# Patient Record
Sex: FEMALE | Race: BLACK OR AFRICAN AMERICAN | Employment: FULL TIME | URBAN - METROPOLITAN AREA
[De-identification: names, ages, dates, MRNs, and addresses within clinical notes are randomized per-mention and may not be internally consistent; named-entity substitution may affect disease eponyms.]

---

## 2017-04-07 ENCOUNTER — TELEPHONE (OUTPATIENT)
Dept: INTERNAL MEDICINE CLINIC | Age: 42
End: 2017-04-07

## 2017-04-07 DIAGNOSIS — R73.03 PREDIABETES: ICD-10-CM

## 2017-04-07 DIAGNOSIS — Z00.00 ROUTINE GENERAL MEDICAL EXAMINATION AT A HEALTH CARE FACILITY: ICD-10-CM

## 2017-04-07 DIAGNOSIS — E55.9 VITAMIN D DEFICIENCY: ICD-10-CM

## 2017-04-07 DIAGNOSIS — E78.00 PURE HYPERCHOLESTEROLEMIA: Primary | ICD-10-CM

## 2017-04-28 DIAGNOSIS — E55.9 VITAMIN D DEFICIENCY: Primary | ICD-10-CM

## 2017-04-28 LAB
25(OH)D3+25(OH)D2 SERPL-MCNC: 17.8 NG/ML (ref 30–100)
ALBUMIN SERPL-MCNC: 4.5 G/DL (ref 3.5–5.5)
ALBUMIN/GLOB SERPL: 1.5 {RATIO} (ref 1.2–2.2)
ALP SERPL-CCNC: 35 IU/L (ref 39–117)
ALT SERPL-CCNC: 12 IU/L (ref 0–32)
AST SERPL-CCNC: 17 IU/L (ref 0–40)
BILIRUB SERPL-MCNC: 0.5 MG/DL (ref 0–1.2)
BUN SERPL-MCNC: 9 MG/DL (ref 6–24)
BUN/CREAT SERPL: 9 (ref 9–23)
CALCIUM SERPL-MCNC: 9.7 MG/DL (ref 8.7–10.2)
CHLORIDE SERPL-SCNC: 102 MMOL/L (ref 96–106)
CHOLEST SERPL-MCNC: 198 MG/DL (ref 100–199)
CO2 SERPL-SCNC: 22 MMOL/L (ref 18–29)
CREAT SERPL-MCNC: 1.02 MG/DL (ref 0.57–1)
ERYTHROCYTE [DISTWIDTH] IN BLOOD BY AUTOMATED COUNT: 13.3 % (ref 12.3–15.4)
EST. AVERAGE GLUCOSE BLD GHB EST-MCNC: 140 MG/DL
GLOBULIN SER CALC-MCNC: 3.1 G/DL (ref 1.5–4.5)
GLUCOSE SERPL-MCNC: 106 MG/DL (ref 65–99)
HBA1C MFR BLD: 6.5 % (ref 4.8–5.6)
HCT VFR BLD AUTO: 36 % (ref 34–46.6)
HDLC SERPL-MCNC: 48 MG/DL
HGB BLD-MCNC: 11.9 G/DL (ref 11.1–15.9)
LDLC SERPL CALC-MCNC: 141 MG/DL (ref 0–99)
MCH RBC QN AUTO: 29.2 PG (ref 26.6–33)
MCHC RBC AUTO-ENTMCNC: 33.1 G/DL (ref 31.5–35.7)
MCV RBC AUTO: 89 FL (ref 79–97)
PLATELET # BLD AUTO: 355 X10E3/UL (ref 150–379)
POTASSIUM SERPL-SCNC: 4.4 MMOL/L (ref 3.5–5.2)
PROT SERPL-MCNC: 7.6 G/DL (ref 6–8.5)
RBC # BLD AUTO: 4.07 X10E6/UL (ref 3.77–5.28)
SODIUM SERPL-SCNC: 140 MMOL/L (ref 134–144)
TRIGL SERPL-MCNC: 47 MG/DL (ref 0–149)
TSH SERPL DL<=0.005 MIU/L-ACNC: 1.05 UIU/ML (ref 0.45–4.5)
VLDLC SERPL CALC-MCNC: 9 MG/DL (ref 5–40)
WBC # BLD AUTO: 4.5 X10E3/UL (ref 3.4–10.8)

## 2017-04-28 RX ORDER — ERGOCALCIFEROL 1.25 MG/1
50000 CAPSULE ORAL
Qty: 8 CAP | Refills: 0 | Status: SHIPPED | OUTPATIENT
Start: 2017-04-28 | End: 2017-05-03

## 2017-04-28 RX ORDER — METFORMIN HYDROCHLORIDE 500 MG/1
500 TABLET ORAL 2 TIMES DAILY WITH MEALS
Qty: 60 TAB | Refills: 0 | Status: SHIPPED | OUTPATIENT
Start: 2017-04-28 | End: 2017-06-07 | Stop reason: SDUPTHER

## 2017-05-03 ENCOUNTER — OFFICE VISIT (OUTPATIENT)
Dept: INTERNAL MEDICINE CLINIC | Age: 42
End: 2017-05-03

## 2017-05-03 DIAGNOSIS — R73.03 PREDIABETES: Primary | ICD-10-CM

## 2017-05-03 DIAGNOSIS — E78.00 PURE HYPERCHOLESTEROLEMIA: ICD-10-CM

## 2017-05-03 DIAGNOSIS — E55.9 VITAMIN D DEFICIENCY: ICD-10-CM

## 2017-05-03 RX ORDER — CHOLECALCIFEROL (VITAMIN D3) 125 MCG
CAPSULE ORAL
COMMUNITY
End: 2017-06-19 | Stop reason: SDUPTHER

## 2017-05-03 NOTE — MR AVS SNAPSHOT
Visit Information Date & Time Provider Department Dept. Phone Encounter #  
 5/3/2017  8:30 AM Josee Evans, [address] [place] [address],[address] [phone] [id number] Follow-up Instructions Return in about 4 months (around 9/3/2017) for prediabetes vit d hld. Upcoming Health Maintenance Date Due INFLUENZA AGE 9 TO ADULT 8/1/2017 PAP AKA CERVICAL CYTOLOGY 7/6/2018 DTaP/Tdap/Td series (2 - Td) 9/11/2024 Allergies as of 5/3/2017  Review Complete On: 5/3/2017 By: Josee Evans MD  
  
 Severity Noted Reaction Type Reactions Pravastatin  08/30/2013    Myalgia Current Immunizations  Reviewed on 9/11/2014 Name Date Influenza Vaccine 10/7/2016 Influenza Vaccine PF 9/11/2014 TB Skin Test (PPD) Intradermal 8/11/2014 Tdap 9/11/2014 Not reviewed this visit You Were Diagnosed With   
  
 Codes Comments Prediabetes    -  Primary ICD-10-CM: R73.03 
ICD-9-CM: 790.29 Pure hypercholesterolemia     ICD-10-CM: E78.00 ICD-9-CM: 272.0 Vitamin D deficiency     ICD-10-CM: E55.9 ICD-9-CM: 268.9 Vitals OB Status Smoking Status Having regular periods Never Smoker Preferred Pharmacy Pharmacy Name Phone [facility] Matt Ojeda [address] [phone] Your Updated Medication List  
  
   
This list is accurate as of: 5/3/17  8:55 AM.  Always use your most recent med list.  
  
  
  
  
 ALPRAZolam 0.25 mg tablet Commonly known as:  XANAX  
TAKE 1 TABLET BY MOUTH ONCE DAILY AS NEEDED FOR ANXIETY  
  
 cetirizine 10 mg tablet Commonly known as:  ZYRTEC  
TAKE ONE TABLET BY MOUTH ONE TIME DAILY  
  
 cyclobenzaprine 5 mg tablet Commonly known as:  FLEXERIL Take 1 Tab by mouth three (3) times daily as needed for Muscle Spasm(s). FISH OIL 1,000 mg Cap Generic drug:  omega-3 fatty acids-vitamin e Take 1 Cap by mouth.  
  
 metFORMIN 500 mg tablet Commonly known as:  GLUCOPHAGE  
 Take 1 Tab by mouth two (2) times daily (with meals). multivitamin tablet Commonly known as:  ONE A DAY Take 1 Tab by mouth daily. VITAMIN D3 2,000 unit Tab Generic drug:  cholecalciferol (vitamin D3) Take  by mouth. We Performed the Following HEMOGLOBIN A1C WITH EAG [67473 CPT(R)] LIPID PANEL [14947 CPT(R)] METABOLIC PANEL, BASIC [48782 CPT(R)] VITAMIN D, 25 HYDROXY [id number] CPT(R)] Follow-up Instructions Return in about 4 months (around 9/3/2017) for prediabetes vit d hld. Introducing [facility] & HEALTH SERVICES! Dear Haley Cruz: Thank you for requesting a [employer] account. Our records indicate that you have previously registered for a [employer] account but its currently inactive. Please call our [employer] support line at [phone]. Additional Information If you have questions, please visit the Frequently Asked Questions section of the [employer] website at [email or web address] Remember, [employer] is NOT to be used for urgent needs. For medical emergencies, dial 911. Now available from your iPhone and Android! Please provide this summary of care documentation to your next provider. Your primary care clinician is listed as Kennard Lesches LEE. If you have any questions after today's visit, please call [phone].

## 2017-05-03 NOTE — PROGRESS NOTES
HISTORY OF PRESENT ILLNESS  Giovanny Monterroso is a 42 y.o. female. HPI     F/u prediabetes HLD, vit D deficiency  Had recent labs --a1c up to 6.5--I called in metformin 500mg bid last week but pt did not take  Has not started on livalo --tolerated but stopped  Vit d 17--not on vit d supplement  Has lost 5 lbs--portion control and walking more. 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 8 Cap 0    metFORMIN (GLUCOPHAGE) 500 mg tablet Take 1 Tab by mouth two (2) times daily (with meals). 60 Tab 0    cyclobenzaprine (FLEXERIL) 5 mg tablet Take 1 Tab by mouth three (3) times daily as needed for Muscle Spasm(s). 30 Tab 1    ALPRAZolam (XANAX) 0.25 mg tablet TAKE 1 TABLET BY MOUTH ONCE DAILY AS NEEDED FOR ANXIETY 10 Tab 0    pitavastatin (LIVALO) 1 mg tab tablet Take 1 Tab by mouth daily. 30 Tab 6    cholecalciferol (VITAMIN D3) 1,000 unit tablet Take  by mouth daily.  omega-3 fatty acids-vitamin e (FISH OIL) 1,000 mg cap Take 1 Cap by mouth.  multivitamin (ONE A DAY) tablet Take 1 Tab by mouth daily.        Allergies   Allergen Reactions    Pravastatin Myalgia      Lab Results  Component Value Date/Time   Hemoglobin A1c 6.5 04/27/2017 08:24 AM   Hemoglobin A1c 6.3 06/09/2016 09:32 AM   Hemoglobin A1c 6.2 06/24/2015 10:03 AM   Glucose 106 04/27/2017 08:24 AM   LDL, calculated 141 04/27/2017 08:24 AM   Creatinine 1.02 04/27/2017 08:24 AM      Lab Results  Component Value Date/Time   Cholesterol, total 198 04/27/2017 08:24 AM   HDL Cholesterol 48 04/27/2017 08:24 AM   LDL, calculated 141 04/27/2017 08:24 AM   Triglyceride 47 04/27/2017 08:24 AM       Lab Results  Component Value Date/Time   GFR est AA 78 04/27/2017 08:24 AM   GFR est non-AA 68 04/27/2017 08:24 AM   Creatinine 1.02 04/27/2017 08:24 AM   BUN 9 04/27/2017 08:24 AM   Sodium 140 04/27/2017 08:24 AM   Potassium 4.4 04/27/2017 08:24 AM   Chloride 102 04/27/2017 08:24 AM   CO2 22 04/27/2017 08:24 AM         ROS    Physical Exam   Constitutional: She appears well-developed and well-nourished. Appears stated age   Cardiovascular: Normal rate, regular rhythm and normal heart sounds. Exam reveals no gallop and no friction rub. No murmur heard. Pulmonary/Chest: Effort normal and breath sounds normal. No respiratory distress. She has no wheezes. Abdominal: Soft. Bowel sounds are normal.   Musculoskeletal: She exhibits no edema. Neurological: She is alert. Psychiatric: She has a normal mood and affect. Nursing note and vitals reviewed. ASSESSMENT and PLAN  Brad King was seen today for physical.    Diagnoses and all orders for this visit:    Prediabetes  -     HEMOGLOBIN A1C WITH EAG  -     METABOLIC PANEL, BASIC   Advised to start metformin 500mg bid   Carb counting handout and discussion    Pure hypercholesterolemia  -     LIPID PANEL  -     METABOLIC PANEL, BASIC   Not on statin , manage with diet and exercise for now    Vitamin D deficiency  -     VITAMIN D, 25 HYDROXY   Take vit d otc 2000 iu qd  rtc 4 months  Follow-up Disposition:  Return in about 4 months (around 9/3/2017) for prediabetes vit d hld.

## 2017-06-07 NOTE — TELEPHONE ENCOUNTER
Requested Prescriptions     Pending Prescriptions Disp Refills    metFORMIN (GLUCOPHAGE) 500 mg tablet 60 Tab 0     Sig: Take 1 Tab by mouth two (2) times daily (with meals).      Last OV-05/03/2017  Next OV-None  Med refilled-04/28/2017

## 2017-06-08 RX ORDER — METFORMIN HYDROCHLORIDE 500 MG/1
500 TABLET ORAL 2 TIMES DAILY WITH MEALS
Qty: 60 TAB | Refills: 11 | Status: SHIPPED | OUTPATIENT
Start: 2017-06-08 | End: 2018-01-30 | Stop reason: CLARIF

## 2017-06-19 RX ORDER — CHOLECALCIFEROL (VITAMIN D3) 125 MCG
1 CAPSULE ORAL
Qty: 8 TAB | Refills: 0 | Status: SHIPPED | OUTPATIENT
Start: 2017-06-25 | End: 2017-08-20 | Stop reason: SDUPTHER

## 2017-06-19 RX ORDER — CETIRIZINE HCL 10 MG
TABLET ORAL
Qty: 30 TAB | Refills: 5 | Status: SHIPPED | OUTPATIENT
Start: 2017-06-19 | End: 2017-08-09 | Stop reason: SDUPTHER

## 2017-06-19 NOTE — TELEPHONE ENCOUNTER
Requested Prescriptions     Pending Prescriptions Disp Refills    cetirizine (ZYRTEC) 10 mg tablet 30 Tab 5     Requested Prescriptions     Pending Prescriptions Disp Refills    cetirizine (ZYRTEC) 10 mg tablet 30 Tab 5    cholecalciferol, vitamin D3, (VITAMIN D3) 2,000 unit tab 8 Tab 0     Sig: Take 1 Tab by mouth every Sunday.        Last OV-5/3/17  Next OV-7/26/17  Med refilled-5/27/16

## 2017-08-09 NOTE — TELEPHONE ENCOUNTER
Requested Prescriptions     Pending Prescriptions Disp Refills    cetirizine (ZYRTEC) 10 mg tablet 90 Tab 3     Sig: TAKE ONE TABLET BY MOUTH ONE TIME DAILY   Last Refill:6/19/17    Last Office Visit: 5/3/17    Upcoming Appointment:9/1/17

## 2017-08-10 LAB
25(OH)D3+25(OH)D2 SERPL-MCNC: 28.1 NG/ML (ref 30–100)
BUN SERPL-MCNC: 11 MG/DL (ref 6–24)
BUN/CREAT SERPL: 12 (ref 9–23)
CALCIUM SERPL-MCNC: 9.9 MG/DL (ref 8.7–10.2)
CHLORIDE SERPL-SCNC: 100 MMOL/L (ref 96–106)
CHOLEST SERPL-MCNC: 226 MG/DL (ref 100–199)
CO2 SERPL-SCNC: 23 MMOL/L (ref 18–29)
CREAT SERPL-MCNC: 0.93 MG/DL (ref 0.57–1)
EST. AVERAGE GLUCOSE BLD GHB EST-MCNC: 123 MG/DL
GLUCOSE SERPL-MCNC: 104 MG/DL (ref 65–99)
HBA1C MFR BLD: 5.9 % (ref 4.8–5.6)
HDLC SERPL-MCNC: 53 MG/DL
LDLC SERPL CALC-MCNC: 161 MG/DL (ref 0–99)
POTASSIUM SERPL-SCNC: 4.4 MMOL/L (ref 3.5–5.2)
SODIUM SERPL-SCNC: 139 MMOL/L (ref 134–144)
TRIGL SERPL-MCNC: 59 MG/DL (ref 0–149)
VLDLC SERPL CALC-MCNC: 12 MG/DL (ref 5–40)

## 2017-08-10 RX ORDER — CETIRIZINE HCL 10 MG
TABLET ORAL
Qty: 90 TAB | Refills: 0 | Status: SHIPPED | OUTPATIENT
Start: 2017-08-10 | End: 2018-04-27 | Stop reason: SDUPTHER

## 2017-08-30 ENCOUNTER — HOSPITAL ENCOUNTER (OUTPATIENT)
Dept: MAMMOGRAPHY | Age: 42
Discharge: HOME | End: 2017-08-30
Attending: INTERNAL MEDICINE
Payer: COMMERCIAL

## 2017-08-30 DIAGNOSIS — Z12.31 VISIT FOR SCREENING MAMMOGRAM: ICD-10-CM

## 2017-08-30 PROCEDURE — 77063 BREAST TOMOSYNTHESIS BI: CPT

## 2017-09-01 ENCOUNTER — OFFICE VISIT (OUTPATIENT)
Dept: INTERNAL MEDICINE CLINIC | Age: 42
End: 2017-09-01

## 2017-09-01 VITALS
TEMPERATURE: 98.8 F | HEART RATE: 90 BPM | WEIGHT: 149 LBS | SYSTOLIC BLOOD PRESSURE: 110 MMHG | HEIGHT: 65 IN | RESPIRATION RATE: 16 BRPM | OXYGEN SATURATION: 97 % | BODY MASS INDEX: 24.83 KG/M2 | DIASTOLIC BLOOD PRESSURE: 76 MMHG

## 2017-09-01 DIAGNOSIS — E78.00 PURE HYPERCHOLESTEROLEMIA: ICD-10-CM

## 2017-09-01 DIAGNOSIS — R73.03 PREDIABETES: Primary | ICD-10-CM

## 2017-09-01 DIAGNOSIS — E55.9 VITAMIN D DEFICIENCY: ICD-10-CM

## 2017-09-01 RX ORDER — ROSUVASTATIN CALCIUM 5 MG/1
5 TABLET, COATED ORAL DAILY
Qty: 30 TAB | Refills: 6 | Status: SHIPPED | OUTPATIENT
Start: 2017-09-01 | End: 2018-01-31

## 2017-09-01 NOTE — MR AVS SNAPSHOT
Visit Information Date & Time Provider Department Dept. Phone Encounter #  
 9/1/2017  9:45 AM Ayan Isidro, [facility] [phone] [id number] Follow-up Instructions Return in about 4 months (around 1/1/2018) for prediaebtes hld vit d. Upcoming Health Maintenance Date Due INFLUENZA AGE 9 TO ADULT 8/1/2017 PAP AKA CERVICAL CYTOLOGY 7/6/2018 DTaP/Tdap/Td series (2 - Td) 9/11/2024 Allergies as of 9/1/2017  Review Complete On: 9/1/2017 By: Ha Feldman Severity Noted Reaction Type Reactions Pravastatin  08/30/2013    Myalgia Current Immunizations  Reviewed on 9/11/2014 Name Date Influenza Vaccine 10/7/2016 Influenza Vaccine PF 9/11/2014 TB Skin Test (PPD) Intradermal 8/11/2014 Tdap 9/11/2014 Not reviewed this visit You Were Diagnosed With   
  
 Codes Comments Prediabetes    -  Primary ICD-10-CM: R73.03 
ICD-9-CM: 790.29 Vitamin D deficiency     ICD-10-CM: E55.9 ICD-9-CM: 268.9 Pure hypercholesterolemia     ICD-10-CM: E78.00 ICD-9-CM: 272.0 Vitals BP Pulse Temp Resp Height(growth percentile) Weight(growth percentile) 110/76 (BP 1 Location: Left arm, BP Patient Position: Sitting) 90 98.8 °F (37.1 °C) (Oral) 16 5' 5\" (1.651 m) 149 lb (67.6 kg) SpO2 BMI OB Status Smoking Status 97% 24.79 kg/m2 IUD Never Smoker Vitals History BMI and BSA Data Body Mass Index Body Surface Area 24.79 kg/m 2 1.76 m 2 Preferred Pharmacy Pharmacy Name Phone [facility], [address] [phone] Your Updated Medication List  
  
   
This list is accurate as of: 9/1/17 10:07 AM.  Always use your most recent med list.  
  
  
  
  
 ALPRAZolam 0.25 mg tablet Commonly known as:  XANAX  
TAKE 1 TABLET BY MOUTH ONCE DAILY AS NEEDED FOR ANXIETY  
  
 cetirizine 10 mg tablet Commonly known as:  ZYRTEC  
 TAKE ONE TABLET BY MOUTH ONE TIME DAILY  
  
 cyclobenzaprine 5 mg tablet Commonly known as:  FLEXERIL Take 1 Tab by mouth three (3) times daily as needed for Muscle Spasm(s). FISH OIL 1,000 mg Cap Generic drug:  omega-3 fatty acids-vitamin e Take 1 Cap by mouth.  
  
 metFORMIN 500 mg tablet Commonly known as:  GLUCOPHAGE Take 1 Tab by mouth two (2) times daily (with meals). multivitamin tablet Commonly known as:  ONE A DAY Take 1 Tab by mouth daily. rosuvastatin 5 mg tablet Commonly known as:  CRESTOR Take 1 Tab by mouth daily. Prescriptions Sent to Pharmacy Refills  
 rosuvastatin (CRESTOR) 5 mg tablet 6 Sig: Take 1 Tab by mouth daily. Class: Normal  
 Pharmacy: [address], [address] [place] [address] #: [phone] Route: Oral  
  
We Performed the Following ALT [id number] CPT(R)] AST [id number] CPT(R)] LIPID PANEL [06930 CPT(R)] Follow-up Instructions Return in about 4 months (around 1/1/2018) for prediaebtes hld vit d. Introducing [facility] & HEALTH SERVICES! Dear Alessandra Heads: Thank you for requesting a [employer] account. Our records indicate that you have previously registered for a [employer] account but its currently inactive. Please call our [employer] support line at [phone]. Additional Information If you have questions, please visit the Frequently Asked Questions section of the [employer] website at [email or web address] Remember, [employer] is NOT to be used for urgent needs. For medical emergencies, dial 911. Now available from your iPhone and Android! Please provide this summary of care documentation to your next provider. Your primary care clinician is listed as Polish Mini GÉNESIS. If you have any questions after today's visit, please call [phone].

## 2017-09-01 NOTE — PROGRESS NOTES
HISTORY OF PRESENT ILLNESS  Johnny Greenwood is a 36 y.o. female. HPI      F/u prediabetes HLD, vit D deficiency  Had recent labs --a1c down to 5.9 on metformn-tolerating med  Not on statin  On heart healthy diet but lipids further elevated today  Vit d 28--not on vit d supplement, took a few doses of ergocalciferol  Has lost 3 lbs--portion control and walking more. 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 8 Cap 6    cetirizine (ZYRTEC) 10 mg tablet TAKE ONE TABLET BY MOUTH ONE TIME DAILY 90 Tab 0    metFORMIN (GLUCOPHAGE) 500 mg tablet Take 1 Tab by mouth two (2) times daily (with meals). 60 Tab 11    cyclobenzaprine (FLEXERIL) 5 mg tablet Take 1 Tab by mouth three (3) times daily as needed for Muscle Spasm(s). 30 Tab 1    ALPRAZolam (XANAX) 0.25 mg tablet TAKE 1 TABLET BY MOUTH ONCE DAILY AS NEEDED FOR ANXIETY 10 Tab 0    omega-3 fatty acids-vitamin e (FISH OIL) 1,000 mg cap Take 1 Cap by mouth.  multivitamin (ONE A DAY) tablet Take 1 Tab by mouth daily.        Allergies   Allergen Reactions    Pravastatin Myalgia      Lab Results  Component Value Date/Time   WBC 4.5 04/27/2017 08:24 AM   HGB 11.9 04/27/2017 08:24 AM   HCT 36.0 04/27/2017 08:24 AM   PLATELET 358 36/03/2572 08:24 AM   MCV 89 04/27/2017 08:24 AM     Lab Results  Component Value Date/Time   Hemoglobin A1c 5.9 08/09/2017 09:03 AM   Hemoglobin A1c 6.5 04/27/2017 08:24 AM   Hemoglobin A1c 6.3 06/09/2016 09:32 AM   Glucose 104 08/09/2017 09:03 AM   LDL, calculated 161 08/09/2017 09:03 AM   Creatinine 0.93 08/09/2017 09:03 AM      Lab Results  Component Value Date/Time   Cholesterol, total 226 08/09/2017 09:03 AM HDL Cholesterol 53 08/09/2017 09:03 AM   LDL, calculated 161 08/09/2017 09:03 AM   Triglyceride 59 08/09/2017 09:03 AM   Lab Results  Component Value Date/Time   ALT (SGPT) 12 04/27/2017 08:24 AM   AST (SGOT) 17 04/27/2017 08:24 AM   Alk. phosphatase 35 04/27/2017 08:24 AM   Bilirubin, total 0.5 04/27/2017 08:24 AM   Albumin 4.5 04/27/2017 08:24 AM   Protein, total 7.6 04/27/2017 08:24 AM   PLATELET 355 25/71/3347 08:24 AM       Lab Results  Component Value Date/Time   GFR est non-AA 76 08/09/2017 09:03 AM   GFR est AA 87 08/09/2017 09:03 AM   Creatinine 0.93 08/09/2017 09:03 AM   BUN 11 08/09/2017 09:03 AM   Sodium 139 08/09/2017 09:03 AM   Potassium 4.4 08/09/2017 09:03 AM   Chloride 100 08/09/2017 09:03 AM   CO2 23 08/09/2017 09:03 AM     Lab Results   Component Value Date/Time    Glucose 104 08/09/2017 09:03 AM                     ROS    Physical Exam   Constitutional: She appears well-developed and well-nourished. Appears stated age   Cardiovascular: Normal rate, regular rhythm and normal heart sounds. Exam reveals no gallop and no friction rub. No murmur heard. Pulmonary/Chest: Effort normal and breath sounds normal. No respiratory distress. She has no wheezes. Abdominal: Soft. Bowel sounds are normal.   Musculoskeletal: She exhibits no edema. Neurological: She is alert. Psychiatric: She has a normal mood and affect. Nursing note and vitals reviewed. ASSESSMENT and PLAN  Diagnoses and all orders for this visit:    1. Prediabetes   Improved a1c on metformin and diet--continue  2. Vitamin D deficiency   Start vit d 1000 iu qd  3. Pure hypercholesterolemia  -     LIPID PANEL  -     AST  -     ALT   Start crestor 5 mg every other day--can stop if myalgias occur   Labs in 6 weeks    Other orders  -     rosuvastatin (CRESTOR) 5 mg tablet; Take 1 Tab by mouth daily. Follow-up Disposition:  Return in about 4 months (around 1/1/2018) for prediaebtes hld vit d.

## 2018-01-09 ENCOUNTER — TELEPHONE (OUTPATIENT)
Dept: INTERNAL MEDICINE CLINIC | Age: 43
End: 2018-01-09

## 2018-01-09 ENCOUNTER — HOSPITAL ENCOUNTER (OUTPATIENT)
Dept: GENERAL RADIOLOGY | Age: 43
Discharge: HOME | End: 2018-01-09
Payer: COMMERCIAL

## 2018-01-09 DIAGNOSIS — Z11.1 TUBERCULOSIS SCREENING: ICD-10-CM

## 2018-01-09 DIAGNOSIS — Z92.89 HISTORY OF POSITIVE PPD: ICD-10-CM

## 2018-01-09 DIAGNOSIS — Z92.89 HISTORY OF POSITIVE PPD: Primary | ICD-10-CM

## 2018-01-09 PROCEDURE — 71046 X-RAY EXAM CHEST 2 VIEWS: CPT

## 2018-01-09 NOTE — TELEPHONE ENCOUNTER
PSR received phone call from pt. Pt is requesting for a order for a chest XRay. Pt also states she is requesting for a yearly TB screening.

## 2018-01-18 DIAGNOSIS — I10 ESSENTIAL HYPERTENSION, MALIGNANT: Primary | ICD-10-CM

## 2018-01-24 ENCOUNTER — APPOINTMENT (OUTPATIENT)
Dept: INTERNAL MEDICINE CLINIC | Age: 43
End: 2018-01-24

## 2018-01-25 LAB
ALBUMIN SERPL-MCNC: 4.5 G/DL (ref 3.5–5.5)
ALBUMIN/GLOB SERPL: 1.4 {RATIO} (ref 1.2–2.2)
ALP SERPL-CCNC: 33 IU/L (ref 39–117)
ALT SERPL-CCNC: 9 IU/L (ref 0–32)
AST SERPL-CCNC: 13 IU/L (ref 0–40)
BASOPHILS # BLD AUTO: 0 X10E3/UL (ref 0–0.2)
BASOPHILS NFR BLD AUTO: 1 %
BILIRUB SERPL-MCNC: 0.4 MG/DL (ref 0–1.2)
BUN SERPL-MCNC: 9 MG/DL (ref 6–24)
BUN/CREAT SERPL: 9 (ref 9–23)
CALCIUM SERPL-MCNC: 9.6 MG/DL (ref 8.7–10.2)
CHLORIDE SERPL-SCNC: 105 MMOL/L (ref 96–106)
CHOLEST SERPL-MCNC: 189 MG/DL (ref 100–199)
CO2 SERPL-SCNC: 20 MMOL/L (ref 18–29)
CREAT SERPL-MCNC: 1.02 MG/DL (ref 0.57–1)
EOSINOPHIL # BLD AUTO: 0.2 X10E3/UL (ref 0–0.4)
EOSINOPHIL NFR BLD AUTO: 5 %
ERYTHROCYTE [DISTWIDTH] IN BLOOD BY AUTOMATED COUNT: 13 % (ref 12.3–15.4)
EST. AVERAGE GLUCOSE BLD GHB EST-MCNC: 123 MG/DL
GFR SERPLBLD CREATININE-BSD FMLA CKD-EPI: 68 ML/MIN/1.73
GFR SERPLBLD CREATININE-BSD FMLA CKD-EPI: 78 ML/MIN/1.73
GLOBULIN SER CALC-MCNC: 3.2 G/DL (ref 1.5–4.5)
GLUCOSE SERPL-MCNC: 97 MG/DL (ref 65–99)
HBA1C MFR BLD: 5.9 % (ref 4.8–5.6)
HCT VFR BLD AUTO: 37.3 % (ref 34–46.6)
HDLC SERPL-MCNC: 47 MG/DL
HGB BLD-MCNC: 12.3 G/DL (ref 11.1–15.9)
IMM GRANULOCYTES # BLD: 0 X10E3/UL (ref 0–0.1)
IMM GRANULOCYTES NFR BLD: 0 %
LDLC SERPL CALC-MCNC: 131 MG/DL (ref 0–99)
LYMPHOCYTES # BLD AUTO: 1.9 X10E3/UL (ref 0.7–3.1)
LYMPHOCYTES NFR BLD AUTO: 48 %
MCH RBC QN AUTO: 29.1 PG (ref 26.6–33)
MCHC RBC AUTO-ENTMCNC: 33 G/DL (ref 31.5–35.7)
MCV RBC AUTO: 88 FL (ref 79–97)
MONOCYTES # BLD AUTO: 0.2 X10E3/UL (ref 0.1–0.9)
MONOCYTES NFR BLD AUTO: 6 %
NEUTROPHILS # BLD AUTO: 1.5 X10E3/UL (ref 1.4–7)
NEUTROPHILS NFR BLD AUTO: 40 %
PLATELET # BLD AUTO: 304 X10E3/UL (ref 150–379)
POTASSIUM SERPL-SCNC: 4.3 MMOL/L (ref 3.5–5.2)
PROT SERPL-MCNC: 7.7 G/DL (ref 6–8.5)
RBC # BLD AUTO: 4.22 X10E6/UL (ref 3.77–5.28)
SODIUM SERPL-SCNC: 143 MMOL/L (ref 134–144)
TRIGL SERPL-MCNC: 55 MG/DL (ref 0–149)
VLDLC SERPL CALC-MCNC: 11 MG/DL (ref 5–40)
WBC # BLD AUTO: 3.9 X10E3/UL (ref 3.4–10.8)

## 2018-01-30 ENCOUNTER — OFFICE VISIT (OUTPATIENT)
Dept: INTERNAL MEDICINE CLINIC | Age: 43
End: 2018-01-30

## 2018-01-30 VITALS
DIASTOLIC BLOOD PRESSURE: 69 MMHG | HEART RATE: 93 BPM | TEMPERATURE: 98.3 F | BODY MASS INDEX: 25.16 KG/M2 | SYSTOLIC BLOOD PRESSURE: 106 MMHG | OXYGEN SATURATION: 100 % | HEIGHT: 65 IN | WEIGHT: 151 LBS

## 2018-01-30 DIAGNOSIS — E78.00 PURE HYPERCHOLESTEROLEMIA: ICD-10-CM

## 2018-01-30 DIAGNOSIS — R73.03 PREDIABETES: Primary | ICD-10-CM

## 2018-01-30 RX ORDER — METFORMIN HYDROCHLORIDE 500 MG/1
500 TABLET, EXTENDED RELEASE ORAL
Qty: 180 TAB | Refills: 3 | Status: SHIPPED | OUTPATIENT
Start: 2018-01-30 | End: 2018-01-30 | Stop reason: SDUPTHER

## 2018-01-30 RX ORDER — METFORMIN HYDROCHLORIDE 500 MG/1
1000 TABLET, EXTENDED RELEASE ORAL
Qty: 180 TAB | Refills: 3 | Status: SHIPPED | OUTPATIENT
Start: 2018-01-30 | End: 2018-12-31 | Stop reason: SDUPTHER

## 2018-01-30 NOTE — PROGRESS NOTES
HISTORY OF PRESENT ILLNESS  Karli Connolly is a 36 y.o. female. HPI     F/u prediabetes hld  Stopped crestor due to myalgias  Takes metformin 500mg bid  Lost a few lbs in weight-exercises several days per week  Feels well overall    Patient Active Problem List    Diagnosis Date Noted    Prediabetes 05/03/2017    Vitamin D deficiency 01/05/2012    Hyperlipidemia 08/17/2011     Current Outpatient Prescriptions   Medication Sig Dispense Refill    metFORMIN ER (GLUCOPHAGE XR) 500 mg tablet Take 2 Tabs by mouth daily (with dinner). 180 Tab 3    omega-3 fatty acids-vitamin e (FISH OIL) 1,000 mg cap Take 1 Cap by mouth.  multivitamin (ONE A DAY) tablet Take 1 Tab by mouth daily.  rosuvastatin (CRESTOR) 5 mg tablet Take 1 Tab by mouth daily. 30 Tab 6    cetirizine (ZYRTEC) 10 mg tablet TAKE ONE TABLET BY MOUTH ONE TIME DAILY 90 Tab 0    cyclobenzaprine (FLEXERIL) 5 mg tablet Take 1 Tab by mouth three (3) times daily as needed for Muscle Spasm(s). 30 Tab 1    ALPRAZolam (XANAX) 0.25 mg tablet TAKE 1 TABLET BY MOUTH ONCE DAILY AS NEEDED FOR ANXIETY 10 Tab 0     Allergies   Allergen Reactions    Crestor [Rosuvastatin] Myalgia    Pravastatin Myalgia      Lab Results  Component Value Date/Time   Hemoglobin A1c 5.9 01/24/2018 08:58 AM   Hemoglobin A1c 5.9 08/09/2017 09:03 AM   Hemoglobin A1c 6.5 04/27/2017 08:24 AM   Glucose 97 01/24/2018 08:58 AM   LDL, calculated 131 01/24/2018 08:58 AM   Creatinine 1.02 01/24/2018 08:58 AM      Lab Results  Component Value Date/Time   ALT (SGPT) 9 01/24/2018 08:58 AM   AST (SGOT) 13 01/24/2018 08:58 AM   Alk.  phosphatase 33 01/24/2018 08:58 AM   Bilirubin, total 0.4 01/24/2018 08:58 AM   Albumin 4.5 01/24/2018 08:58 AM   Protein, total 7.7 01/24/2018 08:58 AM   PLATELET 203 90/38/1248 08:58 AM       Lab Results  Component Value Date/Time   GFR est non-AA 68 01/24/2018 08:58 AM   GFR est AA 78 01/24/2018 08:58 AM   Creatinine 1.02 01/24/2018 08:58 AM   BUN 9 01/24/2018 08:58 AM   Sodium 143 01/24/2018 08:58 AM   Potassium 4.3 01/24/2018 08:58 AM   Chloride 105 01/24/2018 08:58 AM   CO2 20 01/24/2018 08:58 AM        ROS    Physical Exam   Constitutional: She appears well-developed and well-nourished. Appears stated age   Cardiovascular: Normal rate, regular rhythm and normal heart sounds. Exam reveals no gallop and no friction rub. No murmur heard. Pulmonary/Chest: Effort normal and breath sounds normal. No respiratory distress. She has no wheezes. She has no rales. She exhibits no tenderness. Abdominal: Soft. Bowel sounds are normal.   Musculoskeletal: She exhibits no edema. Neurological: She is alert. Psychiatric: She has a normal mood and affect. Nursing note and vitals reviewed. ASSESSMENT and PLAN  Diagnoses and all orders for this visit:    1. Prediabetes   Stable a1c, normal fasting glucose   Change metformin to SR 1000mg every day   Continue diet and exercise  2. Pure hypercholesterolemia    Improved with diet and exercise  Other orders  -     metFORMIN ER (GLUCOPHAGE XR) 500 mg tablet; Take 2 Tabs by mouth daily (with dinner). Follow-up Disposition:  Return in about 6 months (around 7/30/2018) for prediabetes hld.

## 2018-01-30 NOTE — MR AVS SNAPSHOT
[address] [facility] 
[phone] Patient: Isamar Leblanc MRN: [id number] [facility]:[birth year] Visit Information Date & Time Provider Department Dept. Phone Encounter #  
 1/30/2018  3:45 PM Marcos Muller, [address] [place] [address],[address] [phone] [id number] Follow-up Instructions Return in about 6 months (around 7/30/2018) for prediabetes hld. Upcoming Health Maintenance Date Due Influenza Age 5 to Adult 8/1/2017 PAP AKA CERVICAL CYTOLOGY 7/6/2018 DTaP/Tdap/Td series (2 - Td) 9/11/2024 Allergies as of 1/30/2018  Review Complete On: 1/30/2018 By: Marcos Muller MD  
  
 Severity Noted Reaction Type Reactions Crestor [Rosuvastatin]  01/30/2018    Myalgia Pravastatin  08/30/2013    Myalgia Current Immunizations  Reviewed on 9/11/2014 Name Date Influenza Vaccine 10/7/2016 Influenza Vaccine PF 9/11/2014 TB Skin Test (PPD) Intradermal 8/11/2014 Tdap 9/11/2014 Not reviewed this visit Vitals BP Pulse Temp Height(growth percentile) Weight(growth percentile) SpO2  
 106/69 (BP 1 Location: Left arm, BP Patient Position: Sitting) 93 98.3 °F (36.8 °C) (Oral) 5' 5\" (1.651 m) 151 lb (68.5 kg) 100% BMI OB Status Smoking Status 25.13 kg/m2 IUD Never Smoker BMI and BSA Data Body Mass Index Body Surface Area  
 25.13 kg/m 2 1.77 m 2 Preferred Pharmacy Pharmacy Name Phone [facility], [address] [address] AT [address] [place] [address] [phone] Your Updated Medication List  
  
   
This list is accurate as of: 1/30/18  3:46 PM.  Always use your most recent med list.  
  
  
  
  
 ALPRAZolam 0.25 mg tablet Commonly known as:  XANAX  
TAKE 1 TABLET BY MOUTH ONCE DAILY AS NEEDED FOR ANXIETY  
  
 cetirizine 10 mg tablet Commonly known as:  ZYRTEC  
TAKE ONE TABLET BY MOUTH ONE TIME DAILY cyclobenzaprine 5 mg tablet Commonly known as:  FLEXERIL Take 1 Tab by mouth three (3) times daily as needed for Muscle Spasm(s). FISH OIL 1,000 mg Cap Generic drug:  omega-3 fatty acids-vitamin e Take 1 Cap by mouth.  
  
 metFORMIN  mg tablet Commonly known as:  GLUCOPHAGE XR Take 2 Tabs by mouth daily (with dinner). multivitamin tablet Commonly known as:  ONE A DAY Take 1 Tab by mouth daily. rosuvastatin 5 mg tablet Commonly known as:  CRESTOR Take 1 Tab by mouth daily. Prescriptions Sent to Pharmacy Refills  
 metFORMIN ER (GLUCOPHAGE XR) 500 mg tablet 3 Sig: Take 2 Tabs by mouth daily (with dinner). Class: Normal  
 Pharmacy: [facility] [address] [place] [address], [address] [place] [address] #: [phone] Route: Oral  
  
Follow-up Instructions Return in about 6 months (around 7/30/2018) for prediabetes hld. Introducing [facility] & HEALTH SERVICES! Dear Jessica Paul: Thank you for requesting a [employer] account. Our records indicate that you have previously registered for a [employer] account but its currently inactive. Please call our [employer] support line at [phone]. Additional Information If you have questions, please visit the Frequently Asked Questions section of the [employer] website at [email or web address] Remember, [employer] is NOT to be used for urgent needs. For medical emergencies, dial 911. Now available from your iPhone and Android! Please provide this summary of care documentation to your next provider. Your primary care clinician is listed as Kristin CAPPS. If you have any questions after today's visit, please call [phone].

## 2018-01-31 ENCOUNTER — OFFICE VISIT (OUTPATIENT)
Dept: PRIMARY CARE CLINIC | Age: 43
End: 2018-01-31

## 2018-01-31 VITALS
BODY MASS INDEX: 25.46 KG/M2 | HEART RATE: 115 BPM | HEIGHT: 65 IN | DIASTOLIC BLOOD PRESSURE: 66 MMHG | WEIGHT: 152.8 LBS | OXYGEN SATURATION: 98 % | TEMPERATURE: 100.4 F | SYSTOLIC BLOOD PRESSURE: 123 MMHG | RESPIRATION RATE: 16 BRPM

## 2018-01-31 DIAGNOSIS — J11.1 INFLUENZA: Primary | ICD-10-CM

## 2018-01-31 DIAGNOSIS — R52 BODY ACHES: ICD-10-CM

## 2018-01-31 LAB
QUICKVUE INFLUENZA TEST: POSITIVE
VALID INTERNAL CONTROL?: YES

## 2018-01-31 RX ORDER — METFORMIN HYDROCHLORIDE 500 MG/1
TABLET ORAL
Refills: 7 | COMMUNITY
Start: 2017-11-30 | End: 2018-01-31

## 2018-01-31 RX ORDER — OSELTAMIVIR PHOSPHATE 75 MG/1
75 CAPSULE ORAL 2 TIMES DAILY
Qty: 10 CAP | Refills: 0 | Status: SHIPPED | OUTPATIENT
Start: 2018-01-31 | End: 2018-02-05

## 2018-01-31 RX ORDER — NICOTINE 11MG/24HR
PATCH, TRANSDERMAL 24 HOURS TRANSDERMAL
Refills: 6 | COMMUNITY
Start: 2017-12-19 | End: 2020-10-26 | Stop reason: SDUPTHER

## 2018-01-31 RX ORDER — IBUPROFEN 200 MG
200 TABLET ORAL
Qty: 30 TAB | Refills: 0 | Status: SHIPPED | OUTPATIENT
Start: 2018-01-31

## 2018-01-31 RX ORDER — MEDROXYPROGESTERONE ACETATE 150 MG/ML
INJECTION, SUSPENSION INTRAMUSCULAR
Refills: 4 | COMMUNITY
Start: 2017-12-20

## 2018-01-31 NOTE — MR AVS SNAPSHOT
[address] [facility] 
 
 
 [address] 
[phone] Patient: Bonifacio Hill MRN: [id number] ORD:3/0/4250 Visit Information Date & Time Provider Department Dept. Phone Encounter #  
 1/31/2018  3:45 PM Guy George Jennifer [id number] Your Appointments 7/31/2018  8:15 AM  
ROUTINE CARE with Daisha Escalera, [address] [place] [address] Appt Note: 6 month follow up  
 [address] [address]  
[address] [facility] [address] Upcoming Health Maintenance Date Due  
 PAP AKA CERVICAL CYTOLOGY 7/6/2018 DTaP/Tdap/Td series (2 - Td) 9/11/2024 Allergies as of 1/31/2018  Review Complete On: 1/31/2018 By: Gala Ruiz MD  
  
 Severity Noted Reaction Type Reactions Crestor [Rosuvastatin]  01/30/2018    Myalgia Pravastatin  08/30/2013    Myalgia Current Immunizations  Reviewed on 9/11/2014 Name Date Influenza Vaccine 9/13/2017, 10/7/2016 Influenza Vaccine PF 9/11/2014 TB Skin Test (PPD) Intradermal 8/11/2014 Tdap 9/11/2014 Not reviewed this visit You Were Diagnosed With   
  
 Codes Comments Influenza    -  Primary ICD-10-CM: J11.1 ICD-9-CM: 028.8 Body aches     ICD-10-CM: R52 ICD-9-CM: 780.96 Vitals BP Pulse Temp Resp Height(growth percentile) Weight(growth percentile) 123/66 (!) 115 100.4 °F (38 °C) (Oral) 16 5' 5\" (1.651 m) 152 lb 12.8 oz (69.3 kg) SpO2 BMI OB Status Smoking Status 98% 25.43 kg/m2 IUD Never Smoker Vitals History BMI and BSA Data Body Mass Index Body Surface Area  
 25.43 kg/m 2 1.78 m 2 Preferred Pharmacy Pharmacy Name Phone [facility] [address] [facility], [address] [place] [address] AT [address] [place] [address] [phone] Your Updated Medication List  
  
   
This list is accurate as of: 1/31/18  4:03 PM.  Always use your most recent med list.  
  
  
  
  
 ALPRAZolam 0.25 mg tablet Commonly known as:  XANAX  
TAKE 1 TABLET BY MOUTH ONCE DAILY AS NEEDED FOR ANXIETY  
  
 cetirizine 10 mg tablet Commonly known as:  ZYRTEC  
TAKE ONE TABLET BY MOUTH ONE TIME DAILY  
  
 cyclobenzaprine 5 mg tablet Commonly known as:  FLEXERIL Take 1 Tab by mouth three (3) times daily as needed for Muscle Spasm(s). FISH OIL 1,000 mg Cap Generic drug:  omega-3 fatty acids-vitamin e Take 1 Cap by mouth.  
  
 medroxyPROGESTERone 150 mg/mL Syrg Commonly known as:  DEPO-PROVERA  
1 PREFILLED SYRINGE, BRING UNIT TO OFFICE  
  
 metFORMIN  mg tablet Commonly known as:  GLUCOPHAGE XR Take 2 Tabs by mouth daily (with dinner). multivitamin tablet Commonly known as:  ONE A DAY Take 1 Tab by mouth daily. oseltamivir 75 mg capsule Commonly known as:  TAMIFLU Take 1 Cap by mouth two (2) times a day for 5 days. VITAMIN D3 2,000 unit Cap capsule Generic drug:  Cholecalciferol (Vitamin D3) TAKE 1 TABLET BY MOUTH EVERY SUNDAY. Prescriptions Sent to Pharmacy Refills  
 oseltamivir (TAMIFLU) 75 mg capsule 0 Sig: Take 1 Cap by mouth two (2) times a day for 5 days. Class: Normal  
 Pharmacy: [facility] [address] [place] [address], [address] [place], [place] [address]: [phone] Route: Oral  
  
We Performed the Following AMB POC RAPID INFLUENZA TEST [36218 CPT(R)] Patient Instructions Influenza (Flu): Care Instructions Your Care Instructions Influenza (flu) is an infection in the lungs and breathing passages. It is caused by the influenza virus. There are different strains, or types, of the flu virus from year to year.  Unlike the common cold, the flu comes on suddenly and the symptoms, such as a cough, congestion, fever, chills, fatigue, aches, and pains, are more severe. These symptoms may last up to 10 days. Although the flu can make you feel very sick, it usually doesn't cause serious health problems. Home treatment is usually all you need for flu symptoms. But your doctor may prescribe antiviral medicine to prevent other health problems, such as pneumonia, from developing. Older people and those who have a long-term health condition, such as lung disease, are most at risk for having pneumonia or other health problems. Follow-up care is a key part of your treatment and safety. Be sure to make and go to all appointments, and call your doctor if you are having problems. It's also a good idea to know your test results and keep a list of the medicines you take. How can you care for yourself at home? · Get plenty of rest. 
· Drink plenty of fluids, enough so that your urine is light yellow or clear like water. If you have kidney, heart, or liver disease and have to limit fluids, talk with your doctor before you increase the amount of fluids you drink. · Take an over-the-counter pain medicine if needed, such as acetaminophen (Tylenol), ibuprofen (Advil, Motrin), or naproxen (Aleve), to relieve fever, headache, and muscle aches. Read and follow all instructions on the label. No one younger than 20 should take aspirin. It has been linked to Reye syndrome, a serious illness. · Do not smoke. Smoking can make the flu worse. If you need help quitting, talk to your doctor about stop-smoking programs and medicines. These can increase your chances of quitting for good. · Breathe moist air from a hot shower or from a sink filled with hot water to help clear a stuffy nose. · Before you use cough and cold medicines, check the label. These medicines may not be safe for young children or for people with certain health problems. · If the skin around your nose and lips becomes sore, put some petroleum jelly on the area. · To ease coughing: ¨ Drink fluids to soothe a scratchy throat. ¨ Suck on cough drops or plain hard candy. ¨ Take an over-the-counter cough medicine that contains dextromethorphan to help you get some sleep. Read and follow all instructions on the label. ¨ Raise your head at night with an extra pillow. This may help you rest if coughing keeps you awake. · Take any prescribed medicine exactly as directed. Call your doctor if you think you are having a problem with your medicine. To avoid spreading the flu · Wash your hands regularly, and keep your hands away from your face. · Stay home from school, work, and other public places until you are feeling better and your fever has been gone for at least 24 hours. The fever needs to have gone away on its own without the help of medicine. · Ask people living with you to talk to their doctors about preventing the flu. They may get antiviral medicine to keep from getting the flu from you. · To prevent the flu in the future, get a flu vaccine every fall. Encourage people living with you to get the vaccine. · Cover your mouth when you cough or sneeze. When should you call for help? Call 911 anytime you think you may need emergency care. For example, call if: 
? · You have severe trouble breathing. ?Call your doctor now or seek immediate medical care if: 
? · You have new or worse trouble breathing. ? · You seem to be getting much sicker. ? · You feel very sleepy or confused. ? · You have a new or higher fever. ? · You get a new rash. ? Watch closely for changes in your health, and be sure to contact your doctor if: 
? · You begin to get better and then get worse. ? · You are not getting better after 1 week. Where can you learn more? Go to http://megan-carlo.info/. Enter W846 in the search box to learn more about \"Influenza (Flu): Care Instructions. \" Current as of: May 12, 2017 Content Version: 11.4 © 0572-9142 Healthwise, Incorporated. Care instructions adapted under license by [employer] (which disclaims liability or warranty for this information). If you have questions about a medical condition or this instruction, always ask your healthcare professional. [address] any warranty or liability for your use of this information. Introducing [facility] & HEALTH SERVICES! Dear Hong Lindsey: Thank you for requesting a [employer] account. Our records indicate that you have previously registered for a [employer] account but its currently inactive. Please call our [employer] support line at [phone]. Additional Information If you have questions, please visit the Frequently Asked Questions section of the [employer] website at [email or web address] Remember, [employer] is NOT to be used for urgent needs. For medical emergencies, dial 911. Now available from your iPhone and Android! Please provide this summary of care documentation to your next provider. Your primary care clinician is listed as Paticia Spray GÉNESIS. If you have any questions after today's visit, please call [phone].

## 2018-01-31 NOTE — PATIENT INSTRUCTIONS

## 2018-01-31 NOTE — PROGRESS NOTES
Chief Complaint   Patient presents with    Cold Symptoms     Coughing, scratchy throat, chills, achy, headache started yesterday

## 2018-01-31 NOTE — LETTER
NOTIFICATION RETURN TO WORK / SCHOOL 
 
1/31/2018 4:03 PM 
 
Ms. Ra Will [address] [place] [address]. [phone] To Whom It May Concern: 
 
Ra Will is currently under the care of [address] [place] [address]. She will return to work/school on: 02/05/2018 If there are questions or concerns please have the patient contact our office.  
 
 
 
Sincerely, 
 
 
Adrienne More MD

## 2018-01-31 NOTE — PROGRESS NOTES
Lata Islas is a 36 y.o. female who presents for bodyaches, chills, nasal congestion, headache. Started yesterday. She has received flu vaccine. She has tried herbal supplements. No sore throat or ear pain, mild cough. Past Medical History:   Diagnosis Date    Hyperlipidemia 8/17/2011    Prediabetes 5/3/2017    Vitamin D deficiency 1/5/2012     Past Surgical History:   Procedure Laterality Date    BREAST SURGERY PROCEDURE UNLISTED  2016    right breast bx--fibroadenoma    HX BREAST BIOPSY Right 2016    US bx    HX GYN      lap -check fallopipan tubes         Meds:   Current Outpatient Prescriptions   Medication Sig Dispense Refill    VITAMIN D3 2,000 unit cap capsule TAKE 1 TABLET BY MOUTH EVERY SUNDAY. 6    medroxyPROGESTERone (DEPO-PROVERA) 150 mg/mL syrg 1 PREFILLED SYRINGE, BRING UNIT TO OFFICE  4    metFORMIN ER (GLUCOPHAGE XR) 500 mg tablet Take 2 Tabs by mouth daily (with dinner). 180 Tab 3    cetirizine (ZYRTEC) 10 mg tablet TAKE ONE TABLET BY MOUTH ONE TIME DAILY 90 Tab 0    cyclobenzaprine (FLEXERIL) 5 mg tablet Take 1 Tab by mouth three (3) times daily as needed for Muscle Spasm(s). 30 Tab 1    ALPRAZolam (XANAX) 0.25 mg tablet TAKE 1 TABLET BY MOUTH ONCE DAILY AS NEEDED FOR ANXIETY 10 Tab 0    omega-3 fatty acids-vitamin e (FISH OIL) 1,000 mg cap Take 1 Cap by mouth.  multivitamin (ONE A DAY) tablet Take 1 Tab by mouth daily.  metFORMIN (GLUCOPHAGE) 500 mg tablet TAKE 1 TAB BY MOUTH TWO (2) TIMES DAILY (WITH MEALS). 7    rosuvastatin (CRESTOR) 5 mg tablet Take 1 Tab by mouth daily. 30 Tab 6       Allergies:    Allergies   Allergen Reactions    Crestor [Rosuvastatin] Myalgia    Pravastatin Myalgia       Smoker:  History   Smoking Status    Never Smoker   Smokeless Tobacco    Never Used       ETOH:   History   Alcohol Use No       FH:   Family History   Problem Relation Age of Onset    Breast Cancer Mother 39    Diabetes Maternal Grandmother     Hypertension Maternal Grandmother     Elevated Lipids Maternal Grandmother        ROS:  General/Constitutional:   No weight loss or weight gain       Eyes:   No redness, pruritis, pain, visual changes, swelling, or discharge      Ears:    No pain, loss or changes in hearing     Nose: Nasal congestion and rhinorrea  Neck:   No swelling, masses, stiffness, pain, or limited movement     Cardiac:    No chest pain      Respiratory:  cough   GI:   No nausea/vomiting, diarrhea, abdominal pain, bloody or dark stools       Skin: No rash     Physical Exam:  Visit Vitals    /66    Pulse (!) 115    Temp 100.4 °F (38 °C) (Oral)    Resp 16    Ht 5' 5\" (1.651 m)    Wt 152 lb 12.8 oz (69.3 kg)    SpO2 98%    BMI 25.43 kg/m2     General: Alert and oriented, mild ill appearance. Responds to all questions appropriately. SKIN: No rash. Normal color. HEAD: No sinus tenderness. EYES: Conjunctiva are clear; pupils round and reactive to light. EARS: External normal, canals clear, tympanic membranes normal.  NOSE: Edema, erythema, clear mucous drainage. OROPHARYNX: Slight tonsil edema, erythema, no exudate. NECK: Supple; no masses; normal lymphadenopathy. LUNGS: Respirations unlabored; clear to auscultation bilaterally, no wheeze, rales or rhonchi. CARDIOVASCULAR: Regular, rate, and rhythm without murmurs, gallops or rubs. EXTREMITIES: No edema, cyanosis or clubbing. NEUROLOGIC: Speech intact; face symmetrical; moves all extremities equally    Rapid influenza A positive. Assessment:    ICD-10-CM ICD-9-CM    1. Influenza J11.1 487.1 oseltamivir (TAMIFLU) 75 mg capsule      ibuprofen (MOTRIN) 200 mg tablet      rrgxkyvwv-rt-uuqiadkd-guaifen (TYLENOL COLD AND FLU SEVERE) 5--200 mg tab   2. Body aches R52 780.96 AMB POC RAPID INFLUENZA TEST     Start tamiflu. Rest as below. Push PO fluids. Return PRN. Plan:  Discharge instructions:  1. Combination cough and cold medicine such as Mucinex DM  2.  Salt water gargle. 3. Plenty of fluids. 4. Ibuprofen (Motrin, Advil):  200mg - take 1-4 tables three times as needed for pain and fever   5. Acetaminophen (Tylenol):  500mg 1-2 tablets every 6 hours as needed for pain and fever. 6. Throat lozenges such as Halls as needed. 7. Humidifier as needed. Follow Up:  Get re-examined if not improved in  5-7 days or if symptoms worsen. If you get suddenly worse, go to the nearest hospital Emergency Room    This note will not be viewable in MyChart.

## 2018-04-27 DIAGNOSIS — Z91.09 ENVIRONMENTAL ALLERGIES: Primary | ICD-10-CM

## 2018-04-27 RX ORDER — CETIRIZINE HCL 10 MG
TABLET ORAL
Qty: 90 TAB | Refills: 0 | Status: SHIPPED | OUTPATIENT
Start: 2018-04-27 | End: 2020-07-16 | Stop reason: ALTCHOICE

## 2018-04-27 NOTE — TELEPHONE ENCOUNTER
Requested Prescriptions     Pending Prescriptions Disp Refills    cetirizine (ZYRTEC) 10 mg tablet 90 Tab 0     Sig: TAKE ONE TABLET BY MOUTH ONE TIME DAILY      PCP: Dagoberto Flores MD    Last appt: 1/30/2018  Future Appointments  Date Time Provider Syeda Alford   7/31/2018 8:15 AM Dagoberto Flores MD [facility] 87       Requested Prescriptions     Pending Prescriptions Disp Refills    cetirizine (ZYRTEC) 10 mg tablet 90 Tab 0     Sig: TAKE ONE TABLET BY MOUTH ONE TIME DAILY

## 2018-07-25 ENCOUNTER — TELEPHONE (OUTPATIENT)
Dept: INTERNAL MEDICINE CLINIC | Age: 43
End: 2018-07-25

## 2018-07-25 NOTE — TELEPHONE ENCOUNTER
Returned call to patient. Left a message on voicemail advising patient to Farida Tinsley order labs. Patient also notified in message that Dr. Macy Tinsley is out of the office today and unable to provide with labs that maybe potentially ordered.

## 2018-07-25 NOTE — TELEPHONE ENCOUNTER
PSR received phone call from pt. Pt is inquiring if she needs to have labs done for her visit for tomorrow and if so, which ones. Pt states she can be reached at [phone] in regards to matter.

## 2018-07-26 ENCOUNTER — OFFICE VISIT (OUTPATIENT)
Dept: INTERNAL MEDICINE CLINIC | Age: 43
End: 2018-07-26

## 2018-07-26 VITALS
BODY MASS INDEX: 25.33 KG/M2 | SYSTOLIC BLOOD PRESSURE: 110 MMHG | TEMPERATURE: 98.2 F | OXYGEN SATURATION: 100 % | HEART RATE: 83 BPM | HEIGHT: 65 IN | DIASTOLIC BLOOD PRESSURE: 76 MMHG | WEIGHT: 152 LBS

## 2018-07-26 DIAGNOSIS — E55.9 VITAMIN D DEFICIENCY: ICD-10-CM

## 2018-07-26 DIAGNOSIS — R73.03 PREDIABETES: ICD-10-CM

## 2018-07-26 DIAGNOSIS — F41.9 ANXIETY: ICD-10-CM

## 2018-07-26 DIAGNOSIS — E78.00 PURE HYPERCHOLESTEROLEMIA: ICD-10-CM

## 2018-07-26 DIAGNOSIS — Z00.00 ROUTINE GENERAL MEDICAL EXAMINATION AT A HEALTH CARE FACILITY: Primary | ICD-10-CM

## 2018-07-26 LAB — HBA1C MFR BLD HPLC: 5.9 % (ref 4.8–5.6)

## 2018-07-26 RX ORDER — ALPRAZOLAM 0.25 MG/1
TABLET ORAL
Qty: 20 TAB | Refills: 0 | Status: SHIPPED | OUTPATIENT
Start: 2018-07-26 | End: 2021-06-01 | Stop reason: SDUPTHER

## 2018-07-26 NOTE — MR AVS SNAPSHOT
[address] 
[phone] Patient: Agustina Cross MRN: [id number] UQY:8/7/1810 Visit Information Date & Time Provider Department Dept. Phone Encounter #  
 7/26/2018  9:00 AM Rylie Pandey, [address] [place] [address],[address] [phone] [id number] Follow-up Instructions Return in about 6 months (around 1/26/2019) for prediaebtes hld. Upcoming Health Maintenance Date Due Influenza Age 5 to Adult 8/1/2018 PAP AKA CERVICAL CYTOLOGY 7/6/2019 DTaP/Tdap/Td series (2 - Td) 9/11/2024 Allergies as of 7/26/2018  Review Complete On: 7/26/2018 By: Raphael Thurman LPN Severity Noted Reaction Type Reactions Crestor [Rosuvastatin]  01/30/2018    Myalgia Pravastatin  08/30/2013    Myalgia Current Immunizations  Reviewed on 9/11/2014 Name Date Influenza Vaccine 9/13/2017, 10/7/2016 Influenza Vaccine PF 9/11/2014 TB Skin Test (PPD) Intradermal 8/11/2014 Tdap 9/11/2014 Not reviewed this visit You Were Diagnosed With   
  
 Codes Comments Prediabetes    -  Primary ICD-10-CM: R73.03 
ICD-9-CM: 790.29 Pure hypercholesterolemia     ICD-10-CM: E78.00 ICD-9-CM: 272.0 Vitamin D deficiency     ICD-10-CM: E55.9 ICD-9-CM: 268.9 Anxiety     ICD-10-CM: F41.9 ICD-9-CM: 300.00 Routine general medical examination at a health care facility     ICD-10-CM: Z00.00 ICD-9-CM: V70.0 Vitals BP Pulse Temp Height(growth percentile) Weight(growth percentile) SpO2  
 110/76 (BP 1 Location: Left arm, BP Patient Position: Sitting) 83 98.2 °F (36.8 °C) (Oral) 5' 5\" (1.651 m) 152 lb (68.9 kg) 100% BMI OB Status Smoking Status 25.29 kg/m2 Injection Never Smoker BMI and BSA Data Body Mass Index Body Surface Area  
 25.29 kg/m 2 1.78 m 2 Preferred Pharmacy Pharmacy Name Phone [facility] [address] [facility], [address]  [facility], [facility] [phone] Your Updated Medication List  
  
   
This list is accurate as of 7/26/18  9:22 AM.  Always use your most recent med list.  
  
  
  
  
 ALPRAZolam 0.25 mg tablet Commonly known as:  XANAX  
TAKE 1 TABLET BY MOUTH ONCE DAILY AS NEEDED FOR ANXIETY  
  
 cetirizine 10 mg tablet Commonly known as:  ZYRTEC  
TAKE ONE TABLET BY MOUTH ONE TIME DAILY  
  
 cyclobenzaprine 5 mg tablet Commonly known as:  FLEXERIL Take 1 Tab by mouth three (3) times daily as needed for Muscle Spasm(s). FISH OIL 1,000 mg Cap Generic drug:  omega-3 fatty acids-vitamin e Take 1 Cap by mouth. ibuprofen 200 mg tablet Commonly known as:  MOTRIN Take 1 Tab by mouth every eight (8) hours as needed for Pain (fever). medroxyPROGESTERone 150 mg/mL Syrg Commonly known as:  DEPO-PROVERA  
1 PREFILLED SYRINGE, BRING UNIT TO OFFICE  
  
 metFORMIN  mg tablet Commonly known as:  GLUCOPHAGE XR Take 2 Tabs by mouth daily (with dinner). multivitamin tablet Commonly known as:  ONE A DAY Take 1 Tab by mouth daily. etuwggowb-rd-pwkhgvsk-guaifen 5--200 mg Tab Commonly known as:  TYLENOL COLD AND FLU SEVERE Take 1 Tab by mouth every six (6) hours as needed. VITAMIN D3 2,000 unit Cap capsule Generic drug:  Cholecalciferol (Vitamin D3) TAKE 1 TABLET BY MOUTH EVERY SUNDAY. Prescriptions Printed Refills ALPRAZolam (XANAX) 0.25 mg tablet 0 Sig: TAKE 1 TABLET BY MOUTH ONCE DAILY AS NEEDED FOR ANXIETY Class: Print We Performed the Following AMB POC HEMOGLOBIN A1C [26704 CPT(R)] Follow-up Instructions Return in about 6 months (around 1/26/2019) for prediaebtes hld. Introducing [facility] & HEALTH SERVICES! Dear Rick Mccoy: Thank you for requesting a [employer] account.   Our records indicate that you have previously registered for a [employer] account but its currently inactive. Please call our [employer] support line at [phone]. Additional Information If you have questions, please visit the Frequently Asked Questions section of the [employer] website at [email or web address] Remember, [employer] is NOT to be used for urgent needs. For medical emergencies, dial 911. Now available from your iPhone and Android! Please provide this summary of care documentation to your next provider. Your primary care clinician is listed as Riccardo CAPPS. If you have any questions after today's visit, please call [phone].

## 2018-07-26 NOTE — PROGRESS NOTES
Chief Complaint   Patient presents with    Labs     Fasting    Blood sugar problem     6 month follow up    Vitamin D Deficiency     6 month follow up

## 2018-07-26 NOTE — PROGRESS NOTES
HISTORY OF PRESENT ILLNESS  Bianca Lagos is a 39 y.o. female. HPI      F/u prediabetes hld low vit d  And wellness/ biometrics  Last a1c 5.9   Last vit D 28 not taking supplemental   Metformin changed to XR last visit  Not on statin--lipids were ok off statin last visit  Sees gyn MD annually -Adelina Covert  [facility] for exercise  Feels well overall  Request refill of xanax for anxiety-takes infrequently  Last OV  Stopped crestor due to myalgias  Takes metformin 500mg bid  Lost a few lbs in weight-exercises several days per week  Feels well overall      Patient Active Problem List    Diagnosis Date Noted    Prediabetes 05/03/2017    Vitamin D deficiency 01/05/2012    Hyperlipidemia 08/17/2011     Current Outpatient Prescriptions   Medication Sig Dispense Refill    cetirizine (ZYRTEC) 10 mg tablet TAKE ONE TABLET BY MOUTH ONE TIME DAILY 90 Tab 0    VITAMIN D3 2,000 unit cap capsule TAKE 1 TABLET BY MOUTH EVERY SUNDAY. 6    medroxyPROGESTERone (DEPO-PROVERA) 150 mg/mL syrg 1 PREFILLED SYRINGE, BRING UNIT TO OFFICE  4    ibuprofen (MOTRIN) 200 mg tablet Take 1 Tab by mouth every eight (8) hours as needed for Pain (fever). 30 Tab 0    xomgewmjg-wp-mjlwmnoh-guaifen (TYLENOL COLD AND FLU SEVERE) 5--200 mg tab Take 1 Tab by mouth every six (6) hours as needed. 24 Tab 0    metFORMIN ER (GLUCOPHAGE XR) 500 mg tablet Take 2 Tabs by mouth daily (with dinner). 180 Tab 3    cyclobenzaprine (FLEXERIL) 5 mg tablet Take 1 Tab by mouth three (3) times daily as needed for Muscle Spasm(s). 30 Tab 1    ALPRAZolam (XANAX) 0.25 mg tablet TAKE 1 TABLET BY MOUTH ONCE DAILY AS NEEDED FOR ANXIETY 10 Tab 0    omega-3 fatty acids-vitamin e (FISH OIL) 1,000 mg cap Take 1 Cap by mouth.  multivitamin (ONE A DAY) tablet Take 1 Tab by mouth daily.        Allergies   Allergen Reactions    Crestor [Rosuvastatin] Myalgia    Pravastatin Myalgia     Social History   Substance Use Topics    Smoking status: Never Smoker    Smokeless tobacco: Never Used    Alcohol use No      Lab Results  Component Value Date/Time   Hemoglobin A1c 5.9 (H) 01/24/2018 08:58 AM   Hemoglobin A1c 5.9 (H) 08/09/2017 09:03 AM   Hemoglobin A1c 6.5 (H) 04/27/2017 08:24 AM   Glucose 97 01/24/2018 08:58 AM   LDL, calculated 131 (H) 01/24/2018 08:58 AM   Creatinine 1.02 (H) 01/24/2018 08:58 AM      Lab Results  Component Value Date/Time   Cholesterol, total 189 01/24/2018 08:58 AM   HDL Cholesterol 47 01/24/2018 08:58 AM   LDL, calculated 131 (H) 01/24/2018 08:58 AM   Triglyceride 55 01/24/2018 08:58 AM     Lab Results  Component Value Date/Time   GFR est non-AA 68 01/24/2018 08:58 AM   GFR est AA 78 01/24/2018 08:58 AM   Creatinine 1.02 (H) 01/24/2018 08:58 AM   BUN 9 01/24/2018 08:58 AM   Sodium 143 01/24/2018 08:58 AM   Potassium 4.3 01/24/2018 08:58 AM   Chloride 105 01/24/2018 08:58 AM   CO2 20 01/24/2018 08:58 AM        Review of Systems   Constitutional: Negative for chills, fever, malaise/fatigue and weight loss. Eyes: Negative for blurred vision and double vision. Respiratory: Negative for cough and shortness of breath. Cardiovascular: Negative for chest pain and palpitations. Gastrointestinal: Negative for abdominal pain, blood in stool, constipation, diarrhea, melena, nausea and vomiting. Genitourinary: Negative for dysuria, frequency, hematuria and urgency. Musculoskeletal: Negative for back pain, falls, joint pain and myalgias. Neurological: Negative for dizziness, tremors and headaches. Physical Exam   Constitutional: She appears well-developed and well-nourished. Appears stated age   HENT:   Mouth/Throat: Oropharynx is clear and moist.   Eyes: Pupils are equal, round, and reactive to light. Neck: No JVD present. No tracheal deviation present. No thyromegaly present. Cardiovascular: Normal rate, regular rhythm, normal heart sounds and intact distal pulses. Exam reveals no gallop and no friction rub.     No murmur heard.  Pulmonary/Chest: Effort normal and breath sounds normal. No respiratory distress. She has no wheezes. Abdominal: Soft. Bowel sounds are normal. She exhibits no distension and no mass. There is no tenderness. There is no rebound and no guarding. Musculoskeletal: She exhibits no edema. Lymphadenopathy:     She has no cervical adenopathy. Neurological: She is alert. No cranial nerve deficit. Skin: Skin is warm. No rash noted. No erythema. No pallor. Psychiatric: She has a normal mood and affect. Her behavior is normal. Judgment and thought content normal.   Nursing note and vitals reviewed. ASSESSMENT and PLAN  Diagnoses and all orders for this visit:    1. Routine general medical examination at a health care facility   [facility] well woman care and preventive/screening   Waist 33 cm   Will complete biometric form once pt brings the form to the office  2. Prediabetes  -     AMB POC HEMOGLOBIN A1C 5.9 stable   Continue low carb diet , exercise    3. Pure hypercholesterolemia   Manage with diet and exercise  4. Vitamin D deficiency   otc vit d 1000 iu every day recommended  5. Anxiety  -     ALPRAZolam (XANAX) 0.25 mg tablet; TAKE 1 TABLET BY MOUTH ONCE DAILY AS NEEDED FOR ANXIETY      Follow-up Disposition:  Return in about 6 months (around 1/26/2019) for prediaebtes hld.

## 2018-10-03 ENCOUNTER — OFFICE VISIT (OUTPATIENT)
Dept: INTERNAL MEDICINE CLINIC | Age: 43
End: 2018-10-03

## 2018-10-03 VITALS
TEMPERATURE: 98 F | HEIGHT: 65 IN | WEIGHT: 152 LBS | HEART RATE: 92 BPM | SYSTOLIC BLOOD PRESSURE: 123 MMHG | BODY MASS INDEX: 25.33 KG/M2 | OXYGEN SATURATION: 100 % | DIASTOLIC BLOOD PRESSURE: 88 MMHG

## 2018-10-03 DIAGNOSIS — M65.4 DE QUERVAIN'S TENOSYNOVITIS, BILATERAL: Primary | ICD-10-CM

## 2018-10-03 NOTE — PROGRESS NOTES
HISTORY OF PRESENT ILLNESS Dinorah Mejia is a 39 y.o. female. HPI  
 
C/o pain I left wrist and and thumb x 1 month Splinted and ibuprofen 800mg tid Took prednisone Now in right wrist and thumb No injury Patient Active Problem List  
 Diagnosis Date Noted  Prediabetes 05/03/2017  Vitamin D deficiency 01/05/2012  Hyperlipidemia 08/17/2011 Current Outpatient Prescriptions Medication Sig Dispense Refill  cetirizine (ZYRTEC) 10 mg tablet TAKE ONE TABLET BY MOUTH ONE TIME DAILY 90 Tab 0  
 VITAMIN D3 2,000 unit cap capsule TAKE 1 TABLET BY MOUTH EVERY SUNDAY. 6  
 medroxyPROGESTERone (DEPO-PROVERA) 150 mg/mL syrg 1 PREFILLED SYRINGE, BRING UNIT TO OFFICE  4  
 ibuprofen (MOTRIN) 200 mg tablet Take 1 Tab by mouth every eight (8) hours as needed for Pain (fever). 30 Tab 0  
 metFORMIN ER (GLUCOPHAGE XR) 500 mg tablet Take 2 Tabs by mouth daily (with dinner). 180 Tab 3  
 multivitamin (ONE A DAY) tablet Take 1 Tab by mouth daily.  ALPRAZolam (XANAX) 0.25 mg tablet TAKE 1 TABLET BY MOUTH ONCE DAILY AS NEEDED FOR ANXIETY 20 Tab 0  ohuactfig-jr-yhvbpvul-guaifen (TYLENOL COLD AND FLU SEVERE) 5--200 mg tab Take 1 Tab by mouth every six (6) hours as needed. 24 Tab 0  cyclobenzaprine (FLEXERIL) 5 mg tablet Take 1 Tab by mouth three (3) times daily as needed for Muscle Spasm(s). 30 Tab 1  
 omega-3 fatty acids-vitamin e (FISH OIL) 1,000 mg cap Take 1 Cap by mouth. Allergies Allergen Reactions  Crestor [Rosuvastatin] Myalgia  Pravastatin Myalgia Lab Results Component Value Date/Time Hemoglobin A1c 5.9 (H) 01/24/2018 08:58 AM  
Hemoglobin A1c 5.9 (H) 08/09/2017 09:03 AM  
Hemoglobin A1c 6.5 (H) 04/27/2017 08:24 AM  
Glucose 97 01/24/2018 08:58 AM  
LDL, calculated 131 (H) 01/24/2018 08:58 AM  
Creatinine 1.02 (H) 01/24/2018 08:58 AM  
  
Lab Results Component Value Date/Time Cholesterol, total 189 01/24/2018 08:58 AM  
 HDL Cholesterol 47 01/24/2018 08:58 AM  
LDL, calculated 131 (H) 01/24/2018 08:58 AM  
Triglyceride 55 01/24/2018 08:58 AM  
 
Lab Results Component Value Date/Time GFR est non-AA 68 01/24/2018 08:58 AM  
GFR est AA 78 01/24/2018 08:58 AM  
Creatinine 1.02 (H) 01/24/2018 08:58 AM  
BUN 9 01/24/2018 08:58 AM  
Sodium 143 01/24/2018 08:58 AM  
Potassium 4.3 01/24/2018 08:58 AM  
Chloride 105 01/24/2018 08:58 AM  
CO2 20 01/24/2018 08:58 AM  
  
 
ROS Physical Exam  
Constitutional: She appears well-developed and well-nourished. Appears stated age Cardiovascular: Normal rate, regular rhythm and normal heart sounds. Exam reveals no gallop and no friction rub. No murmur heard. Pulmonary/Chest: Effort normal and breath sounds normal. No respiratory distress. She has no wheezes. Abdominal: Soft. Bowel sounds are normal.  
Musculoskeletal: She exhibits no edema. Positive finklestein's b/l Neurological: She is alert. Psychiatric: She has a normal mood and affect. Nursing note and vitals reviewed. ASSESSMENT and PLAN Diagnoses and all orders for this visit: 1. De Quervain's tenosynovitis, bilateral 
-     REFERRAL TO ORTHOPEDIC SURGERY Ice karlene, wrist splints continue Follow-up Disposition: 
Return if symptoms worsen or fail to improve.

## 2018-10-03 NOTE — PROGRESS NOTES
HISTORY OF PRESENT ILLNESS Son Leigh is a 39 y.o. female. HPI Here for acute care Last OV 
F/u prediabetes hld low vit d  And wellness/ biometrics Last a1c 5.9  Last vit D 28 not taking supplemental  
Metformin changed to XR last visit Not on statin--lipids were ok off statin last visit Sees gyn MD annually -Janny [facility] [employer] for exercise Feels well overall Request refill of xanax for anxiety-takes infrequently Patient Active Problem List  
 Diagnosis Date Noted  Prediabetes 05/03/2017  Vitamin D deficiency 01/05/2012  Hyperlipidemia 08/17/2011 Current Outpatient Prescriptions Medication Sig Dispense Refill  cetirizine (ZYRTEC) 10 mg tablet TAKE ONE TABLET BY MOUTH ONE TIME DAILY 90 Tab 0  
 VITAMIN D3 2,000 unit cap capsule TAKE 1 TABLET BY MOUTH EVERY SUNDAY. 6  
 medroxyPROGESTERone (DEPO-PROVERA) 150 mg/mL syrg 1 PREFILLED SYRINGE, BRING UNIT TO OFFICE  4  
 ibuprofen (MOTRIN) 200 mg tablet Take 1 Tab by mouth every eight (8) hours as needed for Pain (fever). 30 Tab 0  
 metFORMIN ER (GLUCOPHAGE XR) 500 mg tablet Take 2 Tabs by mouth daily (with dinner). 180 Tab 3  
 multivitamin (ONE A DAY) tablet Take 1 Tab by mouth daily.  ALPRAZolam (XANAX) 0.25 mg tablet TAKE 1 TABLET BY MOUTH ONCE DAILY AS NEEDED FOR ANXIETY 20 Tab 0  camxmynty-zi-urqfuccq-guaifen (TYLENOL COLD AND FLU SEVERE) 5--200 mg tab Take 1 Tab by mouth every six (6) hours as needed. 24 Tab 0  cyclobenzaprine (FLEXERIL) 5 mg tablet Take 1 Tab by mouth three (3) times daily as needed for Muscle Spasm(s). 30 Tab 1  
 omega-3 fatty acids-vitamin e (FISH OIL) 1,000 mg cap Take 1 Cap by mouth. Allergies Allergen Reactions  Crestor [Rosuvastatin] Myalgia  Pravastatin Myalgia Lab Results Component Value Date/Time Hemoglobin A1c 5.9 (H) 01/24/2018 08:58 AM  
Hemoglobin A1c 5.9 (H) 08/09/2017 09:03 AM  
Hemoglobin A1c 6.5 (H) 04/27/2017 08:24 AM  
 Glucose 97 01/24/2018 08:58 AM  
LDL, calculated 131 (H) 01/24/2018 08:58 AM  
Creatinine 1.02 (H) 01/24/2018 08:58 AM  
  
Lab Results Component Value Date/Time Cholesterol, total 189 01/24/2018 08:58 AM  
HDL Cholesterol 47 01/24/2018 08:58 AM  
LDL, calculated 131 (H) 01/24/2018 08:58 AM  
Triglyceride 55 01/24/2018 08:58 AM  
 
Lab Results Component Value Date/Time GFR est non-AA 68 01/24/2018 08:58 AM  
GFR est AA 78 01/24/2018 08:58 AM  
Creatinine 1.02 (H) 01/24/2018 08:58 AM  
BUN 9 01/24/2018 08:58 AM  
Sodium 143 01/24/2018 08:58 AM  
Potassium 4.3 01/24/2018 08:58 AM  
Chloride 105 01/24/2018 08:58 AM  
CO2 20 01/24/2018 08:58 AM  
  
 
ROS Physical Exam  
Constitutional: She appears well-developed and well-nourished. Appears stated age Cardiovascular: Normal rate, regular rhythm and normal heart sounds. Exam reveals no gallop and no friction rub. No murmur heard. Pulmonary/Chest: Effort normal and breath sounds normal. No respiratory distress. She has no wheezes. Abdominal: Soft. Bowel sounds are normal.  
Musculoskeletal: She exhibits no edema. Neurological: She is alert. Psychiatric: She has a normal mood and affect. Nursing note and vitals reviewed. ASSESSMENT and PLAN 
{ASSESSMENT/PLAN:[id number]}

## 2018-10-03 NOTE — MR AVS SNAPSHOT
[address] [facility] 
[phone] Patient: Jayden Alston MRN: [id number] RSD:[id number] Visit Information Date & Time Provider Department Dept. Phone Encounter #  
 10/3/2018  9:00 AM Apolonia Shah, [address] [place] [phone] [id number] Follow-up Instructions Return if symptoms worsen or fail to improve. Your Appointments 1/31/2019  8:45 AM  
ROUTINE CARE with Apolonia Shah, [address] Nusrat Cronin) Appt Note: 6 month follow up  
 [address] [address]  
[address] [facility] [address] [facility] Upcoming Health Maintenance Date Due Influenza Age 5 to Adult 8/1/2018 PAP AKA CERVICAL CYTOLOGY 7/6/2019 DTaP/Tdap/Td series (2 - Td) 9/11/2024 Allergies as of 10/3/2018  Review Complete On: 10/3/2018 By: Apolonia Shah MD  
  
 Severity Noted Reaction Type Reactions Crestor [Rosuvastatin]  01/30/2018    Myalgia Pravastatin  08/30/2013    Myalgia Current Immunizations  Reviewed on 9/11/2014 Name Date Influenza Vaccine 9/13/2017, 10/7/2016 Influenza Vaccine PF 9/11/2014 TB Skin Test (PPD) Intradermal 8/11/2014 Tdap 9/11/2014 Not reviewed this visit You Were Diagnosed With   
  
 Codes Comments De Quervain's tenosynovitis, bilateral    -  Primary ICD-10-CM: M65.4 ICD-9-CM: 727.04 Vitals BP Pulse Temp Height(growth percentile) Weight(growth percentile) SpO2  
 123/88 (BP 1 Location: Left arm, BP Patient Position: Sitting) 92 98 °F (36.7 °C) (Oral) 5' 5\" (1.651 m) 152 lb (68.9 kg) 100% BMI OB Status Smoking Status 25.29 kg/m2 Injection Never Smoker BMI and BSA Data Body Mass Index Body Surface Area  
 25.29 kg/m 2 1.78 m 2 Preferred Pharmacy Pharmacy Name Phone [facility] [facility], [address]  [facility] [phone] Your Updated Medication List  
  
   
This list is accurate as of 10/3/18  9:24 AM.  Always use your most recent med list.  
  
  
  
  
 ALPRAZolam 0.25 mg tablet Commonly known as:  XANAX  
TAKE 1 TABLET BY MOUTH ONCE DAILY AS NEEDED FOR ANXIETY  
  
 cetirizine 10 mg tablet Commonly known as:  ZYRTEC  
TAKE ONE TABLET BY MOUTH ONE TIME DAILY  
  
 cyclobenzaprine 5 mg tablet Commonly known as:  FLEXERIL Take 1 Tab by mouth three (3) times daily as needed for Muscle Spasm(s). FISH OIL 1,000 mg Cap Generic drug:  omega-3 fatty acids-vitamin e Take 1 Cap by mouth. ibuprofen 200 mg tablet Commonly known as:  MOTRIN Take 1 Tab by mouth every eight (8) hours as needed for Pain (fever). medroxyPROGESTERone 150 mg/mL Syrg Commonly known as:  DEPO-PROVERA  
1 PREFILLED SYRINGE, BRING UNIT TO OFFICE  
  
 metFORMIN  mg tablet Commonly known as:  GLUCOPHAGE XR Take 2 Tabs by mouth daily (with dinner). multivitamin tablet Commonly known as:  ONE A DAY Take 1 Tab by mouth daily. twxmnawhf-hv-lnaownlb-guaifen 5--200 mg Tab Commonly known as:  TYLENOL COLD AND FLU SEVERE Take 1 Tab by mouth every six (6) hours as needed. VITAMIN D3 2,000 unit Cap capsule Generic drug:  Cholecalciferol (Vitamin D3) TAKE 1 TABLET BY MOUTH EVERY SUNDAY. We Performed the Following REFERRAL TO ORTHOPEDIC SURGERY [REF62 Custom] Comments:  
 Please evaluate patient for dequervains tenosynovitis Follow-up Instructions Return if symptoms worsen or fail to improve. To-Do List   
 10/18/2018 9:00 AM  
  Appointment with [facility] 3 at [facility] ([phone]) Shower or bathe using soap and water.   Do not use deodorant, powder, perfumes, or lotion the day of your exam.  If your prior mammograms were not performed at [facility] 6 please bring films with you or forward prior images 2 days before your procedure. Check in at registration 15min before your appointment time unless you were instructed to do otherwise. A script is not necessary for screening. If you have one, please bring it on the day of the mammogram or have it faxed to the department. [facility]  [phone] [facility] [phone] [facility] [phone] [facility] [phone] Referral Information Referral ID Referred By Referred To  
  
 [id number] FRANKY CAPPS   
   [phone] [facility] [address] [place] [address] Phone: [phone] Visits Status Start Date End Date 1 New Request 10/3/18 10/3/19 If your referral has a status of pending review or denied, additional information will be sent to support the outcome of this decision. Introducing [facility]! [facility] introduces Enish patient portal. Now you can access parts of your medical record, email your doctor's office, and request medication refills online. 1. In your internet browser, go to [email or web address] 2. Click on the First Time User? Click Here link in the Sign In box. You will see the New Member Sign Up page. 3. Enter your Enish Access Code exactly as it appears below. You will not need to use this code after youve completed the sign-up process. If you do not sign up before the expiration date, you must request a new code. · Enish Access Code: [email or web address] Expires: 11/12/2018  9:11 AM 
 
4. Enter the last four digits of your Social Security Number (xxxx) and Date of Birth (mm/dd/yyyy) as indicated and click Submit. You will be taken to the next sign-up page. 5. Create a Enish ID. This will be your Enish login ID and cannot be changed, so think of one that is secure and easy to remember. 6. Create a [employer] password. You can change your password at any time. 7. Enter your Password Reset Question and Answer. This can be used at a later time if you forget your password. 8. Enter your e-mail address. You will receive e-mail notification when new information is available in [address] 9. Click Sign Up. You can now view and download portions of your medical record. 10. Click the Download Summary menu link to download a portable copy of your medical information. If you have questions, please visit the Frequently Asked Questions section of the [employer] website. Remember, [employer] is NOT to be used for urgent needs. For medical emergencies, dial 911. Now available from your iPhone and Android! Please provide this summary of care documentation to your next provider. Your primary care clinician is listed as Yash CAPPS. If you have any questions after today's visit, please call [phone].

## 2018-10-18 ENCOUNTER — HOSPITAL ENCOUNTER (OUTPATIENT)
Dept: MAMMOGRAPHY | Age: 43
Discharge: HOME | End: 2018-10-18
Attending: INTERNAL MEDICINE
Payer: COMMERCIAL

## 2018-10-18 DIAGNOSIS — Z12.39 SCREENING BREAST EXAMINATION: ICD-10-CM

## 2018-10-18 PROCEDURE — 77063 BREAST TOMOSYNTHESIS BI: CPT

## 2018-11-30 ENCOUNTER — TELEPHONE (OUTPATIENT)
Dept: INTERNAL MEDICINE CLINIC | Age: 43
End: 2018-11-30

## 2018-11-30 RX ORDER — FLUTICASONE PROPIONATE 50 MCG
2 SPRAY, SUSPENSION (ML) NASAL DAILY
Qty: 1 BOTTLE | Refills: 11 | Status: SHIPPED | OUTPATIENT
Start: 2018-11-30 | End: 2019-01-31

## 2018-11-30 RX ORDER — FLUTICASONE PROPIONATE 50 MCG
2 SPRAY, SUSPENSION (ML) NASAL DAILY
Qty: 1 BOTTLE | Refills: 3 | Status: SHIPPED | OUTPATIENT
Start: 2018-11-30

## 2019-01-31 ENCOUNTER — OFFICE VISIT (OUTPATIENT)
Dept: INTERNAL MEDICINE CLINIC | Age: 44
End: 2019-01-31

## 2019-01-31 VITALS
DIASTOLIC BLOOD PRESSURE: 79 MMHG | WEIGHT: 153 LBS | HEART RATE: 92 BPM | HEIGHT: 65 IN | OXYGEN SATURATION: 100 % | SYSTOLIC BLOOD PRESSURE: 120 MMHG | TEMPERATURE: 98.9 F | BODY MASS INDEX: 25.49 KG/M2

## 2019-01-31 DIAGNOSIS — E55.9 VITAMIN D DEFICIENCY: ICD-10-CM

## 2019-01-31 DIAGNOSIS — E78.00 PURE HYPERCHOLESTEROLEMIA: ICD-10-CM

## 2019-01-31 DIAGNOSIS — R73.03 PREDIABETES: Primary | ICD-10-CM

## 2019-01-31 RX ORDER — METFORMIN HYDROCHLORIDE 500 MG/1
TABLET, EXTENDED RELEASE ORAL
Qty: 180 TAB | Refills: 3 | Status: SHIPPED | OUTPATIENT
Start: 2019-01-31 | End: 2019-02-01 | Stop reason: SDUPTHER

## 2019-01-31 NOTE — PROGRESS NOTES
HISTORY OF PRESENT ILLNESS Ashlie Alanis is a 39 y.o. female. HPI  
  
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 
No cp or sob sxs Not interested in trying another class of lipid lowering medication Last OV: 
Last a1c 5.9  Last vit D 28 not taking supplemental  
Metformin changed to XR last visit Not on statin--lipids were ok off statin last visit Sees gyn MD annually -Mele Khanna [employer] for exercise Feels well overall Request refill of xanax for anxiety-takes infrequently Last OV Stopped crestor due to myalgias Takes metformin 500mg bid Lost a few lbs in weight-exercises several days per week Feels well overall 
  
  
    
Patient Active Problem List  
 
 
 
 
Patient Active Problem List  
 Diagnosis Date Noted  Prediabetes 05/03/2017  Vitamin D deficiency 01/05/2012  Hyperlipidemia 08/17/2011 Current Outpatient Medications Medication Sig Dispense Refill  metFORMIN ER (GLUCOPHAGE XR) 500 mg tablet TAKE 2 TABLETS BY MOUTH DAILY WITH DINNER 180 Tab 3  
 fluticasone (FLONASE) 50 mcg/actuation nasal spray 2 Sprays by Both Nostrils route daily. 1 Bottle 3  
 sodium chloride (SALINE MIST) 0.65 % nasal squeeze bottle 0.05 mL by Both Nostrils route as needed for Congestion. 30 mL 3  
 fluticasone (FLONASE) 50 mcg/actuation nasal spray 2 Sprays by Both Nostrils route daily. 1 Bottle 11  
 ALPRAZolam (XANAX) 0.25 mg tablet TAKE 1 TABLET BY MOUTH ONCE DAILY AS NEEDED FOR ANXIETY 20 Tab 0  
 cetirizine (ZYRTEC) 10 mg tablet TAKE ONE TABLET BY MOUTH ONE TIME DAILY 90 Tab 0  
 VITAMIN D3 2,000 unit cap capsule TAKE 1 TABLET BY MOUTH EVERY SUNDAY.   6  
 medroxyPROGESTERone (DEPO-PROVERA) 150 mg/mL syrg 1 PREFILLED SYRINGE, BRING UNIT TO OFFICE  4  
 ibuprofen (MOTRIN) 200 mg tablet Take 1 Tab by mouth every eight (8) hours as needed for Pain (fever). 30 Tab 0  ajqpmvplv-oi-njkodypk-guaifen (TYLENOL COLD AND FLU SEVERE) 5--200 mg tab Take 1 Tab by mouth every six (6) hours as needed. 24 Tab 0  cyclobenzaprine (FLEXERIL) 5 mg tablet Take 1 Tab by mouth three (3) times daily as needed for Muscle Spasm(s). 30 Tab 1  
 omega-3 fatty acids-vitamin e (FISH OIL) 1,000 mg cap Take 1 Cap by mouth.  multivitamin (ONE A DAY) tablet Take 1 Tab by mouth daily. Allergies Allergen Reactions  Crestor [Rosuvastatin] Myalgia  Pravastatin Myalgia Lab Results Component Value Date/Time Hemoglobin A1c 5.9 (H) 01/24/2018 08:58 AM  
 Hemoglobin A1c 5.9 (H) 08/09/2017 09:03 AM  
 Hemoglobin A1c 6.5 (H) 04/27/2017 08:24 AM  
 Glucose 97 01/24/2018 08:58 AM  
 LDL, calculated 131 (H) 01/24/2018 08:58 AM  
 Creatinine 1.02 (H) 01/24/2018 08:58 AM  
  
Lab Results Component Value Date/Time Cholesterol, total 189 01/24/2018 08:58 AM  
 HDL Cholesterol 47 01/24/2018 08:58 AM  
 LDL, calculated 131 (H) 01/24/2018 08:58 AM  
 Triglyceride 55 01/24/2018 08:58 AM  
 
Lab Results Component Value Date/Time GFR est non-AA 68 01/24/2018 08:58 AM  
 GFR est AA 78 01/24/2018 08:58 AM  
 Creatinine 1.02 (H) 01/24/2018 08:58 AM  
 BUN 9 01/24/2018 08:58 AM  
 Sodium 143 01/24/2018 08:58 AM  
 Potassium 4.3 01/24/2018 08:58 AM  
 Chloride 105 01/24/2018 08:58 AM  
 CO2 20 01/24/2018 08:58 AM  
  
ROS Physical Exam  
Constitutional: She appears well-developed and well-nourished. Appears stated age Cardiovascular: Normal rate, regular rhythm and normal heart sounds. Exam reveals no gallop and no friction rub. No murmur heard. Pulmonary/Chest: Effort normal and breath sounds normal. No respiratory distress. She has no wheezes. Abdominal: Soft. Bowel sounds are normal.  
Musculoskeletal: She exhibits no edema. Neurological: She is alert. Psychiatric: She has a normal mood and affect. Nursing note and vitals reviewed. ASSESSMENT and PLAN Diagnoses and all orders for this visit: 1. Prediabetes -     METABOLIC PANEL, COMPREHENSIVE 
-     HEMOGLOBIN A1C WITH EAG Refilled metformin 2. Vitamin D deficiency 
-     VITAMIN D, 25 HYDROXY 3. Pure hypercholesterolemia -     METABOLIC PANEL, COMPREHENSIVE 
-     LIPID PANEL Manage with low fat low chol diet and exercise Other orders 
-     metFORMIN ER (GLUCOPHAGE XR) 500 mg tablet; TAKE 2 TABLETS BY MOUTH DAILY WITH DINNER Follow-up Disposition: 
Return in about 6 months (around 7/31/2019) for prediabetes hld.

## 2019-01-31 NOTE — PROGRESS NOTES
Chief Complaint Patient presents with  Follow-up 6 month routine check  Labs Fasting  Medication Refill Metformin 90 day supply

## 2019-02-01 LAB
25(OH)D3+25(OH)D2 SERPL-MCNC: 24.8 NG/ML (ref 30–100)
ALBUMIN SERPL-MCNC: 4.7 G/DL (ref 3.5–5.5)
ALBUMIN/GLOB SERPL: 1.4 {RATIO} (ref 1.2–2.2)
ALP SERPL-CCNC: 41 IU/L (ref 39–117)
ALT SERPL-CCNC: 13 IU/L (ref 0–32)
AST SERPL-CCNC: 13 IU/L (ref 0–40)
BILIRUB SERPL-MCNC: 0.5 MG/DL (ref 0–1.2)
BUN SERPL-MCNC: 7 MG/DL (ref 6–24)
BUN/CREAT SERPL: 7 (ref 9–23)
CALCIUM SERPL-MCNC: 10.4 MG/DL (ref 8.7–10.2)
CHLORIDE SERPL-SCNC: 106 MMOL/L (ref 96–106)
CHOLEST SERPL-MCNC: 209 MG/DL (ref 100–199)
CO2 SERPL-SCNC: 21 MMOL/L (ref 20–29)
CREAT SERPL-MCNC: 0.96 MG/DL (ref 0.57–1)
EST. AVERAGE GLUCOSE BLD GHB EST-MCNC: 128 MG/DL
GLOBULIN SER CALC-MCNC: 3.3 G/DL (ref 1.5–4.5)
GLUCOSE SERPL-MCNC: 104 MG/DL (ref 65–99)
HBA1C MFR BLD: 6.1 % (ref 4.8–5.6)
HDLC SERPL-MCNC: 53 MG/DL
LDLC SERPL CALC-MCNC: 147 MG/DL (ref 0–99)
POTASSIUM SERPL-SCNC: 4.5 MMOL/L (ref 3.5–5.2)
PROT SERPL-MCNC: 8 G/DL (ref 6–8.5)
SODIUM SERPL-SCNC: 143 MMOL/L (ref 134–144)
TRIGL SERPL-MCNC: 47 MG/DL (ref 0–149)
VLDLC SERPL CALC-MCNC: 9 MG/DL (ref 5–40)

## 2019-02-01 RX ORDER — METFORMIN HYDROCHLORIDE 500 MG/1
TABLET, EXTENDED RELEASE ORAL
Qty: 360 TAB | Refills: 3 | Status: SHIPPED | OUTPATIENT
Start: 2019-02-01 | End: 2020-01-10 | Stop reason: SDUPTHER

## 2019-07-25 NOTE — PROGRESS NOTES
HISTORY OF PRESENT ILLNESS  Rufina Fuentes is a 38 y.o. female. 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 1 Bottle 3    sodium chloride (SALINE MIST) 0.65 % nasal squeeze bottle 0.05 mL by Both Nostrils route as needed for Congestion. 30 mL 3    ALPRAZolam (XANAX) 0.25 mg tablet TAKE 1 TABLET BY MOUTH ONCE DAILY AS NEEDED FOR ANXIETY 20 Tab 0    cetirizine (ZYRTEC) 10 mg tablet TAKE ONE TABLET BY MOUTH ONE TIME DAILY 90 Tab 0    VITAMIN D3 2,000 unit cap capsule TAKE 1 TABLET BY MOUTH EVERY SUNDAY. 6    medroxyPROGESTERone (DEPO-PROVERA) 150 mg/mL syrg 1 PREFILLED SYRINGE, BRING UNIT TO OFFICE  4    ibuprofen (MOTRIN) 200 mg tablet Take 1 Tab by mouth every eight (8) hours as needed for Pain (fever). 30 Tab 0    eemwhopcs-eh-ouqtgvbn-guaifen (TYLENOL COLD AND FLU SEVERE) 5--200 mg tab Take 1 Tab by mouth every six (6) hours as needed. 24 Tab 0    cyclobenzaprine (FLEXERIL) 5 mg tablet Take 1 Tab by mouth three (3) times daily as needed for Muscle Spasm(s). 30 Tab 1    omega-3 fatty acids-vitamin e (FISH OIL) 1,000 mg cap Take 1 Cap by mouth.         multivitamin (ONE A DAY) tablet Take 1 Tab by mouth daily. Allergies   Allergen Reactions    Crestor [Rosuvastatin] Myalgia    Pravastatin Myalgia      Lab Results   Component Value Date/Time    Hemoglobin A1c 6.1 (H) 01/31/2019 09:38 AM    Hemoglobin A1c 5.9 (H) 01/24/2018 08:58 AM    Hemoglobin A1c 5.9 (H) 08/09/2017 09:03 AM    Glucose 104 (H) 01/31/2019 09:38 AM    LDL, calculated 147 (H) 01/31/2019 09:38 AM    Creatinine 0.96 01/31/2019 09:38 AM      Lab Results   Component Value Date/Time    GFR est non-AA 72 01/31/2019 09:38 AM    GFR est AA 83 01/31/2019 09:38 AM    Creatinine 0.96 01/31/2019 09:38 AM    BUN 7 01/31/2019 09:38 AM    Sodium 143 01/31/2019 09:38 AM    Potassium 4.5 01/31/2019 09:38 AM    Chloride 106 01/31/2019 09:38 AM    CO2 21 01/31/2019 09:38 AM        ROS    Physical Exam   Constitutional: She appears well-developed and well-nourished. Appears stated age   Cardiovascular: Normal rate, regular rhythm and normal heart sounds. Exam reveals no gallop and no friction rub. No murmur heard. Pulmonary/Chest: Effort normal and breath sounds normal. No respiratory distress. She has no wheezes. Abdominal: Soft. Bowel sounds are normal.   Musculoskeletal: She exhibits no edema. Neurological: She is alert. Psychiatric: She has a normal mood and affect. Nursing note and vitals reviewed. ASSESSMENT and PLAN  Diagnoses and all orders for this visit:    1. Prediabetes  -     AMB POC HEMOGLOBIN A1C 5.5 wnl  -     METABOLIC PANEL, BASIC  -     CBC W/O DIFF   Continue metformin 4 every day   Continue low carb diet   Regular exercise and weight reduction recommended    2. Pure hypercholesterolemia  -     METABOLIC PANEL, BASIC  -     LIPID PANEL  -     CBC W/O DIFF  -     TSH 3RD GENERATION   Manage with diet and exercise    Follow-up and Dispositions    · Return in about 6 months (around 1/26/2020) for prediabetes hld.

## 2019-07-26 ENCOUNTER — OFFICE VISIT (OUTPATIENT)
Dept: INTERNAL MEDICINE CLINIC | Age: 44
End: 2019-07-26

## 2019-07-26 VITALS
OXYGEN SATURATION: 100 % | HEIGHT: 65 IN | SYSTOLIC BLOOD PRESSURE: 114 MMHG | HEART RATE: 92 BPM | TEMPERATURE: 98.4 F | RESPIRATION RATE: 16 BRPM | BODY MASS INDEX: 25.66 KG/M2 | DIASTOLIC BLOOD PRESSURE: 78 MMHG | WEIGHT: 154 LBS

## 2019-07-26 DIAGNOSIS — E78.00 PURE HYPERCHOLESTEROLEMIA: ICD-10-CM

## 2019-07-26 DIAGNOSIS — R73.03 PREDIABETES: Primary | ICD-10-CM

## 2019-07-26 LAB — HBA1C MFR BLD HPLC: 5.5 % (ref 4.8–5.6)

## 2019-07-26 NOTE — PATIENT INSTRUCTIONS
Office Policies    Phone calls/patient messages:            Please allow up to 24 hours for someone in the office to contact you about your call or message. Be mindful your provider may be out of the office or your message may require further review. We encourage you to use [facility] for your messages as this is a faster, more efficient way to communicate with our office                         Medication Refills:            Prescription medications require 48-72 business hours to process. We encourage you to use [facility] for your refills. For controlled medications: Please allow 72 business hours to process. Certain medications may require you to  a written prescription at our office. NO narcotic/controlled medications will be prescribed after 4pm Monday through Friday or on weekends              Form/Paperwork Completion:            Please note a $25 fee may incur for all paperwork for completed by our providers. We ask that you allow 7-10 business days. Pre-payment is due prior to picking up/faxing the completed form. You may also download your forms to [facility] to have your doctor print off.

## 2019-07-27 LAB
BUN SERPL-MCNC: 10 MG/DL (ref 6–24)
BUN/CREAT SERPL: 11 (ref 9–23)
CALCIUM SERPL-MCNC: 9.7 MG/DL (ref 8.7–10.2)
CHLORIDE SERPL-SCNC: 103 MMOL/L (ref 96–106)
CHOLEST SERPL-MCNC: 220 MG/DL (ref 100–199)
CO2 SERPL-SCNC: 22 MMOL/L (ref 20–29)
CREAT SERPL-MCNC: 0.91 MG/DL (ref 0.57–1)
ERYTHROCYTE [DISTWIDTH] IN BLOOD BY AUTOMATED COUNT: 12.9 % (ref 12.3–15.4)
GLUCOSE SERPL-MCNC: 87 MG/DL (ref 65–99)
HCT VFR BLD AUTO: 40.4 % (ref 34–46.6)
HDLC SERPL-MCNC: 61 MG/DL
HGB BLD-MCNC: 12.9 G/DL (ref 11.1–15.9)
LDLC SERPL CALC-MCNC: 147 MG/DL (ref 0–99)
MCH RBC QN AUTO: 28.9 PG (ref 26.6–33)
MCHC RBC AUTO-ENTMCNC: 31.9 G/DL (ref 31.5–35.7)
MCV RBC AUTO: 91 FL (ref 79–97)
PLATELET # BLD AUTO: 373 X10E3/UL (ref 150–450)
POTASSIUM SERPL-SCNC: 4.3 MMOL/L (ref 3.5–5.2)
RBC # BLD AUTO: 4.46 X10E6/UL (ref 3.77–5.28)
SODIUM SERPL-SCNC: 142 MMOL/L (ref 134–144)
TRIGL SERPL-MCNC: 59 MG/DL (ref 0–149)
TSH SERPL DL<=0.005 MIU/L-ACNC: 0.83 UIU/ML (ref 0.45–4.5)
VLDLC SERPL CALC-MCNC: 12 MG/DL (ref 5–40)
WBC # BLD AUTO: 4.5 X10E3/UL (ref 3.4–10.8)

## 2019-10-29 ENCOUNTER — HOSPITAL ENCOUNTER (OUTPATIENT)
Dept: MAMMOGRAPHY | Age: 44
Discharge: HOME | End: 2019-10-29
Attending: INTERNAL MEDICINE
Payer: COMMERCIAL

## 2019-10-29 DIAGNOSIS — Z12.31 ENCOUNTER FOR MAMMOGRAM TO ESTABLISH BASELINE MAMMOGRAM: ICD-10-CM

## 2019-10-29 PROCEDURE — 77063 BREAST TOMOSYNTHESIS BI: CPT

## 2020-01-09 NOTE — PROGRESS NOTES
HISTORY OF PRESENT ILLNESS  Jarad Lopez is a 38 y.o. female. HPI   6 month f/u prediabetes and HLD  Last a1c 5.5 on metformin 4 tabs qd  Last -statin intolerant  Exercising more several days per week  Diet is good per pt  No fsbs checks      Last OV  F/u prediabetes hld ( statin intolerant) low vit d    Metformin was increased to 2000 mg every day for a1c 6.1 last OV  a1c 5.5-made diet changes    Not exercising at gym but walks a lot  Feels well          Patient Active Problem List    Diagnosis Date Noted    Prediabetes 05/03/2017    Vitamin D deficiency 01/05/2012    Hyperlipidemia 08/17/2011     Current Outpatient Medications   Medication Sig Dispense Refill    metFORMIN ER (GLUCOPHAGE XR) 500 mg tablet TAKE 4 TABLETS BY MOUTH DAILY WITH DINNER 360 Tab 3    fluticasone (FLONASE) 50 mcg/actuation nasal spray 2 Sprays by Both Nostrils route daily. 1 Bottle 3    sodium chloride (SALINE MIST) 0.65 % nasal squeeze bottle 0.05 mL by Both Nostrils route as needed for Congestion. 30 mL 3    ALPRAZolam (XANAX) 0.25 mg tablet TAKE 1 TABLET BY MOUTH ONCE DAILY AS NEEDED FOR ANXIETY 20 Tab 0    cetirizine (ZYRTEC) 10 mg tablet TAKE ONE TABLET BY MOUTH ONE TIME DAILY 90 Tab 0    VITAMIN D3 2,000 unit cap capsule TAKE 1 TABLET BY MOUTH EVERY SUNDAY. 6    medroxyPROGESTERone (DEPO-PROVERA) 150 mg/mL syrg 1 PREFILLED SYRINGE, BRING UNIT TO OFFICE  4    ibuprofen (MOTRIN) 200 mg tablet Take 1 Tab by mouth every eight (8) hours as needed for Pain (fever). 30 Tab 0    ijdyzfvyw-au-tnvellpj-guaifen (TYLENOL COLD AND FLU SEVERE) 5--200 mg tab Take 1 Tab by mouth every six (6) hours as needed. 24 Tab 0    cyclobenzaprine (FLEXERIL) 5 mg tablet Take 1 Tab by mouth three (3) times daily as needed for Muscle Spasm(s). 30 Tab 1    omega-3 fatty acids-vitamin e (FISH OIL) 1,000 mg cap Take 1 Cap by mouth.  multivitamin (ONE A DAY) tablet Take 1 Tab by mouth daily.        Allergies   Allergen Reactions    Crestor [Rosuvastatin] Myalgia    Pravastatin Myalgia      Lab Results   Component Value Date/Time    WBC 4.5 07/26/2019 09:06 AM    HGB 12.9 07/26/2019 09:06 AM    HCT 40.4 07/26/2019 09:06 AM    PLATELET 460 20/29/3535 09:06 AM    MCV 91 07/26/2019 09:06 AM     Lab Results   Component Value Date/Time    Hemoglobin A1c 6.1 (H) 01/31/2019 09:38 AM    Hemoglobin A1c 5.9 (H) 01/24/2018 08:58 AM    Hemoglobin A1c 5.9 (H) 08/09/2017 09:03 AM    Glucose 87 07/26/2019 09:06 AM    LDL, calculated 147 (H) 07/26/2019 09:06 AM    Creatinine 0.91 07/26/2019 09:06 AM      Lab Results   Component Value Date/Time    Cholesterol, total 220 (H) 07/26/2019 09:06 AM    HDL Cholesterol 61 07/26/2019 09:06 AM    LDL, calculated 147 (H) 07/26/2019 09:06 AM    Triglyceride 59 07/26/2019 09:06 AM     Lab Results   Component Value Date/Time    GFR est non-AA 76 07/26/2019 09:06 AM    GFR est AA 88 07/26/2019 09:06 AM    Creatinine 0.91 07/26/2019 09:06 AM    BUN 10 07/26/2019 09:06 AM    Sodium 142 07/26/2019 09:06 AM    Potassium 4.3 07/26/2019 09:06 AM    Chloride 103 07/26/2019 09:06 AM    CO2 22 07/26/2019 09:06 AM        ROS    Physical Exam  Vitals signs and nursing note reviewed. Constitutional:       Appearance: She is well-developed. Comments: Appears stated age   Cardiovascular:      Rate and Rhythm: Normal rate and regular rhythm. Heart sounds: Normal heart sounds. No murmur. No friction rub. No gallop. Pulmonary:      Effort: Pulmonary effort is normal. No respiratory distress. Breath sounds: Normal breath sounds. No wheezing. Abdominal:      General: Bowel sounds are normal.      Palpations: Abdomen is soft. Neurological:      Mental Status: She is alert. ASSESSMENT and PLAN  Diagnoses and all orders for this visit:    1. Prediabetes  -     HEMOGLOBIN A1C WITH EAG   Refilled metformin   Continue healthy diet and regular exercise  2.  Pure hypercholesterolemia  -     LIPID PANEL  - CBC W/O DIFF  -     METABOLIC PANEL, COMPREHENSIVE   Statin, zetia intolerant   Try red yeast rice  Other orders  -     metFORMIN ER (GLUCOPHAGE XR) 500 mg tablet; TAKE 4 TABLETS BY MOUTH DAILY WITH DINNER      Follow-up and Dispositions    · Return in about 6 months (around 7/10/2020) for prediabetes and HLD.

## 2020-01-10 ENCOUNTER — OFFICE VISIT (OUTPATIENT)
Dept: INTERNAL MEDICINE CLINIC | Age: 45
End: 2020-01-10

## 2020-01-10 VITALS
WEIGHT: 151 LBS | DIASTOLIC BLOOD PRESSURE: 75 MMHG | HEART RATE: 71 BPM | SYSTOLIC BLOOD PRESSURE: 111 MMHG | OXYGEN SATURATION: 100 % | TEMPERATURE: 98.5 F | RESPIRATION RATE: 16 BRPM | HEIGHT: 65 IN | BODY MASS INDEX: 25.16 KG/M2

## 2020-01-10 DIAGNOSIS — E78.00 PURE HYPERCHOLESTEROLEMIA: ICD-10-CM

## 2020-01-10 DIAGNOSIS — R73.03 PREDIABETES: Primary | ICD-10-CM

## 2020-01-10 RX ORDER — AMPICILLIN TRIHYDRATE 250 MG
600 CAPSULE ORAL
Qty: 30 CAP | Refills: 5 | Status: SHIPPED | OUTPATIENT
Start: 2020-01-10 | End: 2020-01-10

## 2020-01-10 RX ORDER — METFORMIN HYDROCHLORIDE 500 MG/1
TABLET, EXTENDED RELEASE ORAL
Qty: 360 TAB | Refills: 3 | Status: SHIPPED | OUTPATIENT
Start: 2020-01-10 | End: 2020-05-15 | Stop reason: SDUPTHER

## 2020-01-10 RX ORDER — MINERAL OIL
ENEMA (ML) RECTAL
COMMUNITY
End: 2020-07-16 | Stop reason: SDUPTHER

## 2020-01-11 LAB
ALBUMIN SERPL-MCNC: 4.9 G/DL (ref 3.5–5.5)
ALBUMIN/GLOB SERPL: 1.8 {RATIO} (ref 1.2–2.2)
ALP SERPL-CCNC: 38 IU/L (ref 39–117)
ALT SERPL-CCNC: 10 IU/L (ref 0–32)
AST SERPL-CCNC: 16 IU/L (ref 0–40)
BILIRUB SERPL-MCNC: 0.3 MG/DL (ref 0–1.2)
BUN SERPL-MCNC: 9 MG/DL (ref 6–24)
BUN/CREAT SERPL: 10 (ref 9–23)
CALCIUM SERPL-MCNC: 10 MG/DL (ref 8.7–10.2)
CHLORIDE SERPL-SCNC: 104 MMOL/L (ref 96–106)
CHOLEST SERPL-MCNC: 233 MG/DL (ref 100–199)
CO2 SERPL-SCNC: 20 MMOL/L (ref 20–29)
CREAT SERPL-MCNC: 0.93 MG/DL (ref 0.57–1)
ERYTHROCYTE [DISTWIDTH] IN BLOOD BY AUTOMATED COUNT: 12.6 % (ref 11.7–15.4)
EST. AVERAGE GLUCOSE BLD GHB EST-MCNC: 126 MG/DL
GLOBULIN SER CALC-MCNC: 2.8 G/DL (ref 1.5–4.5)
GLUCOSE SERPL-MCNC: 96 MG/DL (ref 65–99)
HBA1C MFR BLD: 6 % (ref 4.8–5.6)
HCT VFR BLD AUTO: 36.7 % (ref 34–46.6)
HDLC SERPL-MCNC: 61 MG/DL
HGB BLD-MCNC: 12.1 G/DL (ref 11.1–15.9)
LDLC SERPL CALC-MCNC: 162 MG/DL (ref 0–99)
MCH RBC QN AUTO: 29.2 PG (ref 26.6–33)
MCHC RBC AUTO-ENTMCNC: 33 G/DL (ref 31.5–35.7)
MCV RBC AUTO: 88 FL (ref 79–97)
PLATELET # BLD AUTO: 336 X10E3/UL (ref 150–450)
POTASSIUM SERPL-SCNC: 4.5 MMOL/L (ref 3.5–5.2)
PROT SERPL-MCNC: 7.7 G/DL (ref 6–8.5)
RBC # BLD AUTO: 4.15 X10E6/UL (ref 3.77–5.28)
SODIUM SERPL-SCNC: 142 MMOL/L (ref 134–144)
TRIGL SERPL-MCNC: 50 MG/DL (ref 0–149)
VLDLC SERPL CALC-MCNC: 10 MG/DL (ref 5–40)
WBC # BLD AUTO: 4.4 X10E3/UL (ref 3.4–10.8)

## 2020-01-28 ENCOUNTER — TELEPHONE (OUTPATIENT)
Dept: INTERNAL MEDICINE CLINIC | Age: 45
End: 2020-01-28

## 2020-01-28 DIAGNOSIS — M25.562 ACUTE PAIN OF BOTH KNEES: Primary | ICD-10-CM

## 2020-01-28 DIAGNOSIS — M25.561 ACUTE PAIN OF BOTH KNEES: Primary | ICD-10-CM

## 2020-01-28 NOTE — TELEPHONE ENCOUNTER
Called, spoke to pt. Two identifiers confirmed. Offered pt an appointment for today. Pt stated she would just like Dr. Tracy Joseph to order a xray. Notified pt I would send message to Dr. Tracy Joseph. Pt verbalized understanding of information discussed w/ no further questions at this time.

## 2020-01-28 NOTE — TELEPHONE ENCOUNTER
Shireen Nation MD  You 8 minutes ago (12:14 PM)     Ordered, please inform    Routing comment      Pt notified. Order left up front for pt to .

## 2020-01-28 NOTE — TELEPHONE ENCOUNTER
Patient states that she recently fell and is having some numbness and tingling in her knees. She states that she doesn't have any bruises but thinks she may need an xray.

## 2020-01-29 ENCOUNTER — HOSPITAL ENCOUNTER (OUTPATIENT)
Dept: GENERAL RADIOLOGY | Age: 45
Discharge: HOME | End: 2020-01-29
Payer: COMMERCIAL

## 2020-01-29 DIAGNOSIS — M25.562 ACUTE PAIN OF BOTH KNEES: ICD-10-CM

## 2020-01-29 DIAGNOSIS — M25.561 ACUTE PAIN OF BOTH KNEES: ICD-10-CM

## 2020-01-29 PROCEDURE — 73562 X-RAY EXAM OF KNEE 3: CPT

## 2020-01-30 NOTE — PROGRESS NOTES
Called, spoke to pt. Two identifiers confirmed. Notified pt of lab results/recommendations per Dr. John Welch. Pt verbalized understanding of information discussed w/ no further questions at this time.

## 2020-02-11 ENCOUNTER — DOCUMENTATION ONLY (OUTPATIENT)
Dept: PRIMARY CARE CLINIC | Age: 45
End: 2020-02-11

## 2020-02-11 RX ORDER — OSELTAMIVIR PHOSPHATE 75 MG/1
75 CAPSULE ORAL DAILY
Qty: 10 CAP | Refills: 0 | OUTPATIENT
Start: 2020-02-11 | End: 2020-02-21

## 2020-02-12 NOTE — PROGRESS NOTES
Pt is requesting tamiflu prophylaxis. Works as nurse. Daughter is confirmed to have Flu B today. Will send tamiflu to her pharmacy.

## 2020-05-15 RX ORDER — METFORMIN HYDROCHLORIDE 500 MG/1
TABLET, EXTENDED RELEASE ORAL
Qty: 360 TAB | Refills: 3 | Status: SHIPPED | OUTPATIENT
Start: 2020-05-15 | End: 2021-07-02

## 2020-05-15 NOTE — TELEPHONE ENCOUNTER
Patient is requesting a 90 day supply of the following pended medication(s) to be sent to [facility]. It is now the patient's preferred pharmacy for all maintenance medications due to cost effectiveness.      [address] [place], [address] [place] [address]  Phone: [phone]

## 2020-07-15 NOTE — PROGRESS NOTES
HISTORY OF PRESENT ILLNESS  Vj Wong is a 54 y.o. female. HPI   Vj Wong is a 54 y.o. female being evaluated by a Virtual Visit (video visit) encounter to address concerns as mentioned above. A caregiver was present when appropriate. Due to this being a TeleHealth encounter (During [facility] public health emergency), evaluation of the following organ systems was limited: Vitals/Constitutional/EENT/Resp/CV/GI//MS/Neuro/Skin/Heme-Lymph-Imm. Pursuant to the emergency declaration under the [facility], [address] [place] [address] authority and the [employer] Act, this Virtual Visit was conducted with patient's (and/or legal guardian's) consent, to reduce the risk of exposure to COVID-19 and provide necessary medical care. Services were provided through a video synchronous discussion virtually to substitute for in-person encounter. --Juan Antonio Silver MD on 7/16/2020 at 12:23 PM    An electronic signature was used to authenticate this note. Here for CPE and f/u Prediabetes and HLD  Home BP  Weight  Last a1c 6.0 -not on statin ( statin intolerance)  zetia was not covered on her insurance in the past   on red yeast rice extract now  Pt stopped vit d weekly -requests lab  C/o chornic right radual wrist pain s/p splint and 2 injections-request 2nd opinion from another hand surgeon  Sees gyn MD-Narayan Khanna      Patient Active Problem List    Diagnosis Date Noted    Prediabetes 05/03/2017    Vitamin D deficiency 01/05/2012    Hyperlipidemia 08/17/2011     Current Outpatient Medications   Medication Sig Dispense Refill    metFORMIN ER (GLUCOPHAGE XR) 500 mg tablet TAKE 4 TABLETS BY MOUTH DAILY WITH DINNER 360 Tab 3    fexofenadine (ALLEGRA) 180 mg tablet Take  by mouth.  fluticasone (FLONASE) 50 mcg/actuation nasal spray 2 Sprays by Both Nostrils route daily.  1 Bottle 3    sodium chloride (SALINE MIST) 0.65 % nasal squeeze bottle 0.05 mL by Both Nostrils route as needed for Congestion. 30 mL 3    ALPRAZolam (XANAX) 0.25 mg tablet TAKE 1 TABLET BY MOUTH ONCE DAILY AS NEEDED FOR ANXIETY 20 Tab 0    cetirizine (ZYRTEC) 10 mg tablet TAKE ONE TABLET BY MOUTH ONE TIME DAILY 90 Tab 0    VITAMIN D3 2,000 unit cap capsule TAKE 1 TABLET BY MOUTH EVERY SUNDAY. 6    medroxyPROGESTERone (DEPO-PROVERA) 150 mg/mL syrg 1 PREFILLED SYRINGE, BRING UNIT TO OFFICE  4    ibuprofen (MOTRIN) 200 mg tablet Take 1 Tab by mouth every eight (8) hours as needed for Pain (fever). 30 Tab 0    nzpcfdheq-oo-uyeiwelg-guaifen (TYLENOL COLD AND FLU SEVERE) 5--200 mg tab Take 1 Tab by mouth every six (6) hours as needed. 24 Tab 0    cyclobenzaprine (FLEXERIL) 5 mg tablet Take 1 Tab by mouth three (3) times daily as needed for Muscle Spasm(s). 30 Tab 1    omega-3 fatty acids-vitamin e (FISH OIL) 1,000 mg cap Take 1 Cap by mouth.  multivitamin (ONE A DAY) tablet Take 1 Tab by mouth daily.        Allergies   Allergen Reactions    Crestor [Rosuvastatin] Myalgia    Pravastatin Myalgia     Social History     Tobacco Use    Smoking status: Never Smoker    Smokeless tobacco: Never Used   Substance Use Topics    Alcohol use: No      Lab Results   Component Value Date/Time    WBC 4.4 01/10/2020 09:35 AM    HGB 12.1 01/10/2020 09:35 AM    HCT 36.7 01/10/2020 09:35 AM    PLATELET 867 15/46/6016 09:35 AM    MCV 88 01/10/2020 09:35 AM     Lab Results   Component Value Date/Time    Hemoglobin A1c 6.0 (H) 01/10/2020 09:35 AM    Hemoglobin A1c 6.1 (H) 01/31/2019 09:38 AM    Hemoglobin A1c 5.9 (H) 01/24/2018 08:58 AM    Glucose 96 01/10/2020 09:35 AM    LDL, calculated 162 (H) 01/10/2020 09:35 AM    Creatinine 0.93 01/10/2020 09:35 AM      Lab Results   Component Value Date/Time    Cholesterol, total 233 (H) 01/10/2020 09:35 AM    HDL Cholesterol 61 01/10/2020 09:35 AM    LDL, calculated 162 (H) 01/10/2020 09:35 AM    Triglyceride 50 01/10/2020 09:35 AM Lab Results   Component Value Date/Time    GFR est non-AA 74 01/10/2020 09:35 AM    GFR est AA 86 01/10/2020 09:35 AM    Creatinine 0.93 01/10/2020 09:35 AM    BUN 9 01/10/2020 09:35 AM    Sodium 142 01/10/2020 09:35 AM    Potassium 4.5 01/10/2020 09:35 AM    Chloride 104 01/10/2020 09:35 AM    CO2 20 01/10/2020 09:35 AM        Review of Systems   Constitutional: Negative for chills, fever, malaise/fatigue and weight loss. Eyes: Negative for blurred vision and double vision. Respiratory: Negative for cough and shortness of breath. Cardiovascular: Negative for chest pain and palpitations. Gastrointestinal: Negative for abdominal pain, blood in stool, constipation, diarrhea, melena, nausea and vomiting. Genitourinary: Negative for dysuria, frequency, hematuria and urgency. Musculoskeletal: Negative for back pain, falls, joint pain and myalgias. Neurological: Negative for dizziness, tremors and headaches. Physical Exam  Constitutional:       Appearance: Normal appearance. She is normal weight. Pulmonary:      Effort: Pulmonary effort is normal.   Neurological:      Mental Status: She is alert. Psychiatric:         Mood and Affect: Mood normal.         Behavior: Behavior normal.         Thought Content: Thought content normal.         Judgment: Judgment normal.         ASSESSMENT and PLAN  Diagnoses and all orders for this visit:    1. Routine general medical examination at a health care facility  -     CBC W/O DIFF  -     TSH 3RD GENERATION  -     LIPID PANEL  -     HEMOGLOBIN A1C WITH EAG  -     METABOLIC PANEL, BASIC  -     VITAMIN D, 25 HYDROXY   Continue healthy lifestyle  2. Prediabetes  -     HEMOGLOBIN A1C WITH EAG    3. Dyslipidemia  -     LIPID PANEL   On red yeasat rice    Had achiness on zetia too per pt  4.  Wrist pain, chronic, right  -     REFERRAL TO ORTHOPEDICS  rtc 1 year CPE

## 2020-07-16 ENCOUNTER — VIRTUAL VISIT (OUTPATIENT)
Dept: INTERNAL MEDICINE CLINIC | Age: 45
End: 2020-07-16

## 2020-07-16 DIAGNOSIS — G89.29 WRIST PAIN, CHRONIC, RIGHT: ICD-10-CM

## 2020-07-16 DIAGNOSIS — R73.03 PREDIABETES: ICD-10-CM

## 2020-07-16 DIAGNOSIS — E78.5 DYSLIPIDEMIA: ICD-10-CM

## 2020-07-16 DIAGNOSIS — M25.531 WRIST PAIN, CHRONIC, RIGHT: ICD-10-CM

## 2020-07-16 DIAGNOSIS — Z00.00 ROUTINE GENERAL MEDICAL EXAMINATION AT A HEALTH CARE FACILITY: Primary | ICD-10-CM

## 2020-07-16 RX ORDER — AMPICILLIN TRIHYDRATE 250 MG
600 CAPSULE ORAL 2 TIMES DAILY
COMMUNITY

## 2020-07-16 RX ORDER — MINERAL OIL
180 ENEMA (ML) RECTAL DAILY
Qty: 90 TAB | Refills: 3 | Status: SHIPPED | OUTPATIENT
Start: 2020-07-16 | End: 2021-07-02

## 2020-07-16 NOTE — TELEPHONE ENCOUNTER
PCP: Cheko Seals MD    Last appt: 1/10/2020  Future Appointments   Date Time Provider Syeda Alford   7/16/2020  8:15 AM Cheko Seals MD [facility] 87       Requested Prescriptions     Pending Prescriptions Disp Refills    fexofenadine (ALLEGRA) 180 mg tablet 90 Tab 3     Sig: Take 1 Tab by mouth daily.

## 2020-07-16 NOTE — PATIENT INSTRUCTIONS
This is an established visit conducted via telemedicine. The patient has been instructed that this meets HIPAA criteria and acknowledges and agrees to this method of visitation.     Salas Romero [place]  [birth year]  7:03 AM  Chief Complaint   Patient presents with    Diabetes    Medication Refill

## 2020-08-10 ENCOUNTER — EMPLOYEE WELLNESS (OUTPATIENT)
Dept: FAMILY MEDICINE CLINIC | Age: 45
End: 2020-08-10

## 2020-08-10 LAB
CHOLEST SERPL-MCNC: 198 MG/DL
GLUCOSE SERPL-MCNC: 95 MG/DL (ref 65–100)
HDLC SERPL-MCNC: 59 MG/DL
LDLC SERPL CALC-MCNC: 125.2 MG/DL (ref 0–100)
TRIGL SERPL-MCNC: 69 MG/DL (ref ?–150)

## 2020-08-15 LAB
25(OH)D3+25(OH)D2 SERPL-MCNC: 19.8 NG/ML (ref 30–100)
BUN SERPL-MCNC: 14 MG/DL (ref 6–24)
BUN/CREAT SERPL: 15 (ref 9–23)
CALCIUM SERPL-MCNC: 10.3 MG/DL (ref 8.7–10.2)
CHLORIDE SERPL-SCNC: 104 MMOL/L (ref 96–106)
CHOLEST SERPL-MCNC: 204 MG/DL (ref 100–199)
CO2 SERPL-SCNC: 23 MMOL/L (ref 20–29)
CREAT SERPL-MCNC: 0.94 MG/DL (ref 0.57–1)
ERYTHROCYTE [DISTWIDTH] IN BLOOD BY AUTOMATED COUNT: 12.6 % (ref 11.7–15.4)
EST. AVERAGE GLUCOSE BLD GHB EST-MCNC: 120 MG/DL
GLUCOSE SERPL-MCNC: 86 MG/DL (ref 65–99)
HBA1C MFR BLD: 5.8 % (ref 4.8–5.6)
HCT VFR BLD AUTO: 37.9 % (ref 34–46.6)
HDLC SERPL-MCNC: 56 MG/DL
HGB BLD-MCNC: 12.3 G/DL (ref 11.1–15.9)
LDLC SERPL CALC-MCNC: 139 MG/DL (ref 0–99)
MCH RBC QN AUTO: 28.6 PG (ref 26.6–33)
MCHC RBC AUTO-ENTMCNC: 32.5 G/DL (ref 31.5–35.7)
MCV RBC AUTO: 88 FL (ref 79–97)
PLATELET # BLD AUTO: 359 X10E3/UL (ref 150–450)
POTASSIUM SERPL-SCNC: 4.7 MMOL/L (ref 3.5–5.2)
RBC # BLD AUTO: 4.3 X10E6/UL (ref 3.77–5.28)
SODIUM SERPL-SCNC: 141 MMOL/L (ref 134–144)
TRIGL SERPL-MCNC: 46 MG/DL (ref 0–149)
TSH SERPL DL<=0.005 MIU/L-ACNC: 1.25 UIU/ML (ref 0.45–4.5)
VLDLC SERPL CALC-MCNC: 9 MG/DL (ref 5–40)
WBC # BLD AUTO: 4.3 X10E3/UL (ref 3.4–10.8)

## 2020-10-15 ENCOUNTER — TRANSCRIBE ORDER (OUTPATIENT)
Dept: SCHEDULING | Age: 45
End: 2020-10-15

## 2020-10-15 DIAGNOSIS — Z12.31 VISIT FOR SCREENING MAMMOGRAM: Primary | ICD-10-CM

## 2020-10-27 RX ORDER — NICOTINE 11MG/24HR
PATCH, TRANSDERMAL 24 HOURS TRANSDERMAL
Qty: 12 CAP | Refills: 3 | Status: SHIPPED | OUTPATIENT
Start: 2020-10-27

## 2020-10-27 NOTE — TELEPHONE ENCOUNTER
Future Appointments:  Future Appointments   Date Time Provider Syeda Lpoezisti   12/7/2020  7:40 AM [facility] 3 [facility]   7/16/2021 10:00 AM Teo Henderson MD [facility] BS AMB        Last Appointment With Me:  7/16/2020     Requested Prescriptions     Pending Prescriptions Disp Refills    Vitamin D3 50 mcg (2,000 unit) cap capsule  6     Sig: TAKE 1 TABLET BY MOUTH EVERY SUNDAY.

## 2020-12-07 ENCOUNTER — HOSPITAL ENCOUNTER (OUTPATIENT)
Dept: MAMMOGRAPHY | Age: 45
Discharge: HOME | End: 2020-12-07
Attending: INTERNAL MEDICINE
Payer: COMMERCIAL

## 2020-12-07 DIAGNOSIS — Z12.31 VISIT FOR SCREENING MAMMOGRAM: ICD-10-CM

## 2020-12-07 PROCEDURE — 77063 BREAST TOMOSYNTHESIS BI: CPT

## 2021-02-08 ENCOUNTER — TELEPHONE (OUTPATIENT)
Dept: INTERNAL MEDICINE CLINIC | Age: 46
End: 2021-02-08

## 2021-02-08 DIAGNOSIS — Z00.00 ROUTINE GENERAL MEDICAL EXAMINATION AT A HEALTH CARE FACILITY: Primary | ICD-10-CM

## 2021-02-08 NOTE — TELEPHONE ENCOUNTER
----- Message from Lopez Aguilar sent at 2/8/2021  1:02 PM EST -----  Regarding: Dr Looney/ telephone  Patient return call    Caller's first and last name and relationship (if not the patient): Pt      Best contact number(s):[phone]      Whose call is being returned: Matt Noble      Details to clarify the request:Pt is requesting a call back and advised she is returning Day's call.       Message from [facility]

## 2021-02-09 ENCOUNTER — VIRTUAL VISIT (OUTPATIENT)
Dept: INTERNAL MEDICINE CLINIC | Age: 46
End: 2021-02-09
Payer: COMMERCIAL

## 2021-02-09 DIAGNOSIS — Z00.00 ROUTINE GENERAL MEDICAL EXAMINATION AT A HEALTH CARE FACILITY: Primary | ICD-10-CM

## 2021-02-09 PROCEDURE — 99396 PREV VISIT EST AGE 40-64: CPT | Performed by: INTERNAL MEDICINE

## 2021-02-09 NOTE — PATIENT INSTRUCTIONS
This is an established visit conducted via telemedicine. The patient has been instructed that this meets HIPAA criteria and acknowledges and agrees to this method of visitation.  
 
Alejandro Centeno [place] 
08/68/15 
65:87 AM

## 2021-02-09 NOTE — PROGRESS NOTES
HISTORY OF PRESENT ILLNESS  Mckinnon London is a 54 y.o. female. HPI     Needs form completed for [place] school and immunization records or titers  Will need CXR for TB screen as she had BCG vaccine as a child  Feels well overall     Patient Active Problem List    Diagnosis Date Noted    Prediabetes 05/03/2017    Vitamin D deficiency 01/05/2012    Hyperlipidemia 08/17/2011     Current Outpatient Medications   Medication Sig Dispense Refill    Vitamin D3 50 mcg (2,000 unit) cap capsule TAKE 1 TABLET BY MOUTH EVERY SUNDAY. 12 Cap 3    red yeast rice extract 600 mg cap Take 600 mg by mouth two (2) times a day.  fexofenadine (ALLEGRA) 180 mg tablet Take 1 Tab by mouth daily. 90 Tab 3    metFORMIN ER (GLUCOPHAGE XR) 500 mg tablet TAKE 4 TABLETS BY MOUTH DAILY WITH DINNER 360 Tab 3    fluticasone (FLONASE) 50 mcg/actuation nasal spray 2 Sprays by Both Nostrils route daily. 1 Bottle 3    sodium chloride (SALINE MIST) 0.65 % nasal squeeze bottle 0.05 mL by Both Nostrils route as needed for Congestion. 30 mL 3    ALPRAZolam (XANAX) 0.25 mg tablet TAKE 1 TABLET BY MOUTH ONCE DAILY AS NEEDED FOR ANXIETY 20 Tab 0    medroxyPROGESTERone (DEPO-PROVERA) 150 mg/mL syrg 1 PREFILLED SYRINGE, BRING UNIT TO OFFICE  4    ibuprofen (MOTRIN) 200 mg tablet Take 1 Tab by mouth every eight (8) hours as needed for Pain (fever). 30 Tab 0    iuyuudxnn-hx-dvehsopg-guaifen (TYLENOL COLD AND FLU SEVERE) 5--200 mg tab Take 1 Tab by mouth every six (6) hours as needed. 24 Tab 0    cyclobenzaprine (FLEXERIL) 5 mg tablet Take 1 Tab by mouth three (3) times daily as needed for Muscle Spasm(s). 30 Tab 1    omega-3 fatty acids-vitamin e (FISH OIL) 1,000 mg cap Take 1 Cap by mouth.  multivitamin (ONE A DAY) tablet Take 1 Tab by mouth daily.        Allergies   Allergen Reactions    Crestor [Rosuvastatin] Myalgia    Pravastatin Myalgia     Social History     Tobacco Use    Smoking status: Never Smoker    Smokeless tobacco: Never Used   Substance Use Topics    Alcohol use: No      Lab Results   Component Value Date/Time    WBC 4.3 08/14/2020 09:33 AM    HGB 12.3 08/14/2020 09:33 AM    HCT 37.9 08/14/2020 09:33 AM    PLATELET 056 57/95/9362 09:33 AM    MCV 88 08/14/2020 09:33 AM     Lab Results   Component Value Date/Time    Hemoglobin A1c 5.8 (H) 08/14/2020 09:33 AM    Hemoglobin A1c 6.0 (H) 01/10/2020 09:35 AM    Hemoglobin A1c 6.1 (H) 01/31/2019 09:38 AM    Glucose 86 08/14/2020 09:33 AM    LDL, calculated 139 (H) 08/14/2020 09:33 AM    Creatinine 0.94 08/14/2020 09:33 AM      Lab Results   Component Value Date/Time    Cholesterol, total 204 (H) 08/14/2020 09:33 AM    HDL Cholesterol 56 08/14/2020 09:33 AM    LDL, calculated 139 (H) 08/14/2020 09:33 AM    Triglyceride 46 08/14/2020 09:33 AM     Lab Results   Component Value Date/Time    GFR est non-AA 73 08/14/2020 09:33 AM    GFR est AA 84 08/14/2020 09:33 AM    Creatinine 0.94 08/14/2020 09:33 AM    BUN 14 08/14/2020 09:33 AM    Sodium 141 08/14/2020 09:33 AM    Potassium 4.7 08/14/2020 09:33 AM    Chloride 104 08/14/2020 09:33 AM    CO2 23 08/14/2020 09:33 AM     Lab Results   Component Value Date/Time    TSH 1.250 08/14/2020 09:33 AM         ROS    Physical Exam  Constitutional:       Appearance: Normal appearance. HENT:      Head: Normocephalic. Pulmonary:      Effort: Pulmonary effort is normal.   Neurological:      General: No focal deficit present. Mental Status: She is alert. Psychiatric:         Mood and Affect: Mood normal.         Behavior: Behavior normal.         Judgment: Judgment normal.         ASSESSMENT and PLAN  Diagnoses and all orders for this visit:    1.  Routine general medical examination at a health care facility     Screening labs ordered   Vaccine titers ordered   cxr in 2018 for TB  screen--absence of symptoms

## 2021-02-10 LAB
BUN SERPL-MCNC: 12 MG/DL (ref 6–24)
BUN/CREAT SERPL: 12 (ref 9–23)
CALCIUM SERPL-MCNC: 10.1 MG/DL (ref 8.7–10.2)
CHLORIDE SERPL-SCNC: 105 MMOL/L (ref 96–106)
CHOLEST SERPL-MCNC: 198 MG/DL (ref 100–199)
CO2 SERPL-SCNC: 22 MMOL/L (ref 20–29)
CREAT SERPL-MCNC: 0.97 MG/DL (ref 0.57–1)
EST. AVERAGE GLUCOSE BLD GHB EST-MCNC: 126 MG/DL
GLUCOSE SERPL-MCNC: 105 MG/DL (ref 65–99)
HBA1C MFR BLD: 6 % (ref 4.8–5.6)
HDLC SERPL-MCNC: 52 MG/DL
LDLC SERPL CALC-MCNC: 134 MG/DL (ref 0–99)
MEV IGG SER IA-ACNC: >300 AU/ML
MUV IGG SER IA-ACNC: >300 AU/ML
POTASSIUM SERPL-SCNC: 4.4 MMOL/L (ref 3.5–5.2)
RUBV IGG SERPL IA-ACNC: 17.6 INDEX
SODIUM SERPL-SCNC: 140 MMOL/L (ref 134–144)
TRIGL SERPL-MCNC: 63 MG/DL (ref 0–149)
TSH SERPL DL<=0.005 MIU/L-ACNC: 1.02 UIU/ML (ref 0.45–4.5)
VLDLC SERPL CALC-MCNC: 12 MG/DL (ref 5–40)
VZV IGG SER IA-ACNC: 2324 INDEX

## 2021-02-11 ENCOUNTER — TELEPHONE (OUTPATIENT)
Dept: INTERNAL MEDICINE CLINIC | Age: 46
End: 2021-02-11

## 2021-02-11 NOTE — TELEPHONE ENCOUNTER
Left message concerning her physical forms. The forms are ready for Yesenia Thomas to  or give us a fax number to fax her forms.

## 2021-02-16 DIAGNOSIS — Z11.1 SCREENING-PULMONARY TB: Primary | ICD-10-CM

## 2021-02-16 NOTE — PROGRESS NOTES
Patient request a TB screening which was required for her physical for form completion.   Dr. Fer Deluna approved for the lab order quantiferon TB-Gold

## 2021-02-20 LAB
GAMMA INTERFERON BACKGROUND BLD IA-ACNC: 0.04 IU/ML
M TB IFN-G BLD-IMP: NEGATIVE
M TB IFN-G CD4+ BCKGRND COR BLD-ACNC: 0.03 IU/ML
MITOGEN IGNF BLD-ACNC: >10 IU/ML
QUANTIFERON INCUBATION, QF1T: NORMAL
QUANTIFERON TB2 AG: 0.04 IU/ML
SERVICE CMNT-IMP: NORMAL

## 2021-06-01 DIAGNOSIS — F41.9 ANXIETY: ICD-10-CM

## 2021-06-01 RX ORDER — ALPRAZOLAM 0.25 MG/1
TABLET ORAL
Qty: 20 TABLET | Refills: 0 | Status: SHIPPED | OUTPATIENT
Start: 2021-06-01

## 2021-06-01 NOTE — TELEPHONE ENCOUNTER
Future Appointments:  Future Appointments   Date Time Provider Syeda Rooneyi   7/16/2021 10:00 AM Tony Nascimento MD [facility] BS AMB        Last Appointment With Me:  2/9/2021     Requested Prescriptions     Pending Prescriptions Disp Refills    ALPRAZolam (XANAX) 0.25 mg tablet 20 Tablet 0     Sig: TAKE 1 TABLET BY MOUTH ONCE DAILY AS NEEDED FOR ANXIETY

## 2021-07-02 RX ORDER — METFORMIN HYDROCHLORIDE 500 MG/1
TABLET, EXTENDED RELEASE ORAL
Qty: 360 TABLET | Refills: 3 | Status: SHIPPED | OUTPATIENT
Start: 2021-07-02 | End: 2021-10-12

## 2021-07-02 RX ORDER — MINERAL OIL
ENEMA (ML) RECTAL
Qty: 90 TABLET | Refills: 3 | Status: SHIPPED | OUTPATIENT
Start: 2021-07-02 | End: 2021-10-12

## 2021-07-16 ENCOUNTER — OFFICE VISIT (OUTPATIENT)
Dept: INTERNAL MEDICINE CLINIC | Age: 46
End: 2021-07-16
Payer: COMMERCIAL

## 2021-07-16 VITALS
HEART RATE: 81 BPM | OXYGEN SATURATION: 100 % | SYSTOLIC BLOOD PRESSURE: 120 MMHG | TEMPERATURE: 99.1 F | DIASTOLIC BLOOD PRESSURE: 80 MMHG | BODY MASS INDEX: 25.39 KG/M2 | RESPIRATION RATE: 16 BRPM | HEIGHT: 66 IN | WEIGHT: 158 LBS

## 2021-07-16 DIAGNOSIS — Z11.59 ENCOUNTER FOR HEPATITIS C SCREENING TEST FOR LOW RISK PATIENT: ICD-10-CM

## 2021-07-16 DIAGNOSIS — E78.00 PURE HYPERCHOLESTEROLEMIA: ICD-10-CM

## 2021-07-16 DIAGNOSIS — R73.03 PREDIABETES: Primary | ICD-10-CM

## 2021-07-16 DIAGNOSIS — E55.9 VITAMIN D DEFICIENCY: ICD-10-CM

## 2021-07-16 LAB
25(OH)D3 SERPL-MCNC: 20.3 NG/ML (ref 30–100)
ANION GAP SERPL CALC-SCNC: 6 MMOL/L (ref 5–15)
BUN SERPL-MCNC: 15 MG/DL (ref 6–20)
BUN/CREAT SERPL: 17 (ref 12–20)
CALCIUM SERPL-MCNC: 10.2 MG/DL (ref 8.5–10.1)
CHLORIDE SERPL-SCNC: 110 MMOL/L (ref 97–108)
CHOLEST SERPL-MCNC: 236 MG/DL
CO2 SERPL-SCNC: 25 MMOL/L (ref 21–32)
CREAT SERPL-MCNC: 0.87 MG/DL (ref 0.55–1.02)
ERYTHROCYTE [DISTWIDTH] IN BLOOD BY AUTOMATED COUNT: 12.7 % (ref 11.5–14.5)
EST. AVERAGE GLUCOSE BLD GHB EST-MCNC: 123 MG/DL
GLUCOSE SERPL-MCNC: 89 MG/DL (ref 65–100)
HBA1C MFR BLD: 5.9 % (ref 4–5.6)
HCT VFR BLD AUTO: 39.7 % (ref 35–47)
HCV AB SERPL QL IA: NONREACTIVE
HCV COMMENT,HCGAC: NORMAL
HDLC SERPL-MCNC: 61 MG/DL
HDLC SERPL: 3.9 {RATIO} (ref 0–5)
HGB BLD-MCNC: 12.5 G/DL (ref 11.5–16)
LDLC SERPL CALC-MCNC: 162.6 MG/DL (ref 0–100)
MCH RBC QN AUTO: 28.7 PG (ref 26–34)
MCHC RBC AUTO-ENTMCNC: 31.5 G/DL (ref 30–36.5)
MCV RBC AUTO: 91.3 FL (ref 80–99)
NRBC # BLD: 0 K/UL (ref 0–0.01)
NRBC BLD-RTO: 0 PER 100 WBC
PLATELET # BLD AUTO: 359 K/UL (ref 150–400)
PMV BLD AUTO: 11.2 FL (ref 8.9–12.9)
POTASSIUM SERPL-SCNC: 4.2 MMOL/L (ref 3.5–5.1)
RBC # BLD AUTO: 4.35 M/UL (ref 3.8–5.2)
SODIUM SERPL-SCNC: 141 MMOL/L (ref 136–145)
TRIGL SERPL-MCNC: 62 MG/DL (ref ?–150)
VLDLC SERPL CALC-MCNC: 12.4 MG/DL
WBC # BLD AUTO: 4.8 K/UL (ref 3.6–11)

## 2021-07-16 PROCEDURE — 99213 OFFICE O/P EST LOW 20 MIN: CPT | Performed by: INTERNAL MEDICINE

## 2021-07-16 NOTE — PROGRESS NOTES
HISTORY OF PRESENT ILLNESS  Smiley Izquierdo is a 51 y.o. female. 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  Weight up a few lbs despite good diet  On red yeast rice for mild HLD  Sees gyn MD for well woman care  Stopped vit d supplement    Patient Active Problem List    Diagnosis Date Noted    Prediabetes 05/03/2017    Vitamin D deficiency 01/05/2012    Hyperlipidemia 08/17/2011     Current Outpatient Medications   Medication Sig Dispense Refill    fexofenadine (ALLEGRA) 180 mg tablet TAKE 1 TABLET BY MOUTH ONE TIME A DAY 90 Tablet 3    metFORMIN ER (GLUCOPHAGE XR) 500 mg tablet TAKE FOUR TABLETS BY MOUTH EVERY DAY WITH DINNER 360 Tablet 3    ALPRAZolam (XANAX) 0.25 mg tablet TAKE 1 TABLET BY MOUTH ONCE DAILY AS NEEDED FOR ANXIETY 20 Tablet 0    Vitamin D3 50 mcg (2,000 unit) cap capsule TAKE 1 TABLET BY MOUTH EVERY SUNDAY. 12 Cap 3    red yeast rice extract 600 mg cap Take 600 mg by mouth two (2) times a day.  sodium chloride (SALINE MIST) 0.65 % nasal squeeze bottle 0.05 mL by Both Nostrils route as needed for Congestion. 30 mL 3    medroxyPROGESTERone (DEPO-PROVERA) 150 mg/mL syrg 1 PREFILLED SYRINGE, BRING UNIT TO OFFICE  4    omega-3 fatty acids-vitamin e (FISH OIL) 1,000 mg cap Take 1 Cap by mouth.  multivitamin (ONE A DAY) tablet Take 1 Tab by mouth daily.  fluticasone (FLONASE) 50 mcg/actuation nasal spray 2 Sprays by Both Nostrils route daily. 1 Bottle 3    ibuprofen (MOTRIN) 200 mg tablet Take 1 Tab by mouth every eight (8) hours as needed for Pain (fever). 30 Tab 0    jvhysbawo-ga-dpgslojs-guaifen (TYLENOL COLD AND FLU SEVERE) 5--200 mg tab Take 1 Tab by mouth every six (6) hours as needed.  (Patient not taking: Reported on 7/16/2021) 24 Tab 0     Allergies   Allergen Reactions    Crestor [Rosuvastatin] Myalgia    Pravastatin Myalgia      Lab Results   Component Value Date/Time    WBC 4.3 08/14/2020 09:33 AM    HGB 12.3 08/14/2020 09:33 AM    HCT 37.9 08/14/2020 09:33 AM    PLATELET 922 78/71/6978 09:33 AM    MCV 88 08/14/2020 09:33 AM     Lab Results   Component Value Date/Time    Hemoglobin A1c 6.0 (H) 02/09/2021 08:33 AM    Hemoglobin A1c 5.8 (H) 08/14/2020 09:33 AM    Hemoglobin A1c 6.0 (H) 01/10/2020 09:35 AM    Glucose 105 (H) 02/09/2021 08:33 AM    LDL, calculated 134 (H) 02/09/2021 08:33 AM    LDL, calculated 139 (H) 08/14/2020 09:33 AM    Creatinine 0.97 02/09/2021 08:33 AM      Lab Results   Component Value Date/Time    Cholesterol, total 198 02/09/2021 08:33 AM    HDL Cholesterol 52 02/09/2021 08:33 AM    LDL, calculated 134 (H) 02/09/2021 08:33 AM    LDL, calculated 139 (H) 08/14/2020 09:33 AM    Triglyceride 63 02/09/2021 08:33 AM     Lab Results   Component Value Date/Time    GFR est non-AA 70 02/09/2021 08:33 AM    GFR est AA 81 02/09/2021 08:33 AM    Creatinine 0.97 02/09/2021 08:33 AM    BUN 12 02/09/2021 08:33 AM    Sodium 140 02/09/2021 08:33 AM    Potassium 4.4 02/09/2021 08:33 AM    Chloride 105 02/09/2021 08:33 AM    CO2 22 02/09/2021 08:33 AM     Lab Results   Component Value Date/Time    TSH 1.020 02/09/2021 08:33 AM      Lab Results   Component Value Date/Time    Glucose 105 (H) 02/09/2021 08:33 AM         ROS    Physical Exam  Vitals and nursing note reviewed. Constitutional:       Appearance: She is well-developed. Comments: Appears stated age   Cardiovascular:      Rate and Rhythm: Normal rate and regular rhythm. Heart sounds: Normal heart sounds. No murmur heard. No friction rub. No gallop. Pulmonary:      Effort: Pulmonary effort is normal. No respiratory distress. Breath sounds: Normal breath sounds. No wheezing. Abdominal:      General: Bowel sounds are normal.      Palpations: Abdomen is soft. Neurological:      Mental Status: She is alert.          ASSESSMENT and PLAN  Diagnoses and all orders for this visit:    1. Prediabetes  -     HEMOGLOBIN A1C WITH EAG; Future  -     METABOLIC PANEL, BASIC; Future   encouraged weight reduction, diet , exercise  2. Pure hypercholesterolemia  -     LIPID PANEL; Future  -     CBC W/O DIFF; Future   Consider zetia if LDL above goal  3. Vitamin D deficiency  -     VITAMIN D, 25 HYDROXY; Future   May need to restart vit d  4. Encounter for hepatitis C screening test for low risk patient  -     HEPATITIS C AB; Future      Follow-up and Dispositions    · Return in about 1 year (around 7/16/2022) for CPE.

## 2021-07-16 NOTE — PATIENT INSTRUCTIONS
Office Policies    Phone calls/patient messages:            Please allow up to 24 hours for someone in the office to contact you about your call or message. Be mindful your provider may be out of the office or your message may require further review. We encourage you to use [employer] for your messages as this is a faster, more efficient way to communicate with our office                         Medication Refills:            Prescription medications require 48-72 business hours to process. We encourage you to use [employer] for your refills. For controlled medications: Please allow 72 business hours to process. Certain medications may require you to  a written prescription at our office. NO narcotic/controlled medications will be prescribed after 4pm Monday through Friday or on weekends              Form/Paperwork Completion:            Please note a $25 fee may incur for all paperwork for completed by our providers. We ask that you allow 7-10 business days. Pre-payment is due prior to picking up/faxing the completed form. You may also download your forms to [employer] to have your doctor print off.

## 2021-07-19 DIAGNOSIS — E78.00 PURE HYPERCHOLESTEROLEMIA: Primary | ICD-10-CM

## 2021-07-19 RX ORDER — EZETIMIBE 10 MG/1
10 TABLET ORAL DAILY
Qty: 90 TABLET | Refills: 3 | Status: SHIPPED | OUTPATIENT
Start: 2021-07-19 | End: 2022-01-17 | Stop reason: SDUPTHER

## 2021-09-15 LAB
CHOLEST SERPL-MCNC: 177 MG/DL
GLUCOSE SERPL-MCNC: 94 MG/DL (ref 65–100)
HDLC SERPL-MCNC: 62 MG/DL
LDLC SERPL CALC-MCNC: 103.8 MG/DL (ref 0–100)
TRIGL SERPL-MCNC: 56 MG/DL (ref ?–150)

## 2021-10-12 RX ORDER — METFORMIN HYDROCHLORIDE 500 MG/1
TABLET, EXTENDED RELEASE ORAL
Qty: 360 TABLET | Refills: 3 | Status: SHIPPED | OUTPATIENT
Start: 2021-10-12 | End: 2022-01-17 | Stop reason: SDUPTHER

## 2021-10-12 RX ORDER — MINERAL OIL
ENEMA (ML) RECTAL
Qty: 90 TABLET | Refills: 3 | Status: SHIPPED | OUTPATIENT
Start: 2021-10-12 | End: 2022-01-17 | Stop reason: SDUPTHER

## 2021-10-20 LAB
CHOLEST SERPL-MCNC: 182 MG/DL (ref 100–199)
HDLC SERPL-MCNC: 54 MG/DL
LDLC SERPL CALC-MCNC: 112 MG/DL (ref 0–99)
TRIGL SERPL-MCNC: 86 MG/DL (ref 0–149)
VLDLC SERPL CALC-MCNC: 16 MG/DL (ref 5–40)

## 2021-12-01 ENCOUNTER — TRANSCRIBE ORDER (OUTPATIENT)
Dept: SCHEDULING | Age: 46
End: 2021-12-01

## 2021-12-01 DIAGNOSIS — Z12.31 VISIT FOR SCREENING MAMMOGRAM: Primary | ICD-10-CM

## 2021-12-27 ENCOUNTER — HOSPITAL ENCOUNTER (OUTPATIENT)
Dept: MAMMOGRAPHY | Age: 46
Discharge: HOME | End: 2021-12-27
Attending: INTERNAL MEDICINE
Payer: COMMERCIAL

## 2021-12-27 DIAGNOSIS — Z12.31 VISIT FOR SCREENING MAMMOGRAM: ICD-10-CM

## 2021-12-27 PROCEDURE — 77063 BREAST TOMOSYNTHESIS BI: CPT

## 2022-01-17 RX ORDER — MINERAL OIL
ENEMA (ML) RECTAL
Qty: 90 TABLET | Refills: 3 | Status: SHIPPED | OUTPATIENT
Start: 2022-01-17

## 2022-01-17 RX ORDER — METFORMIN HYDROCHLORIDE 500 MG/1
TABLET, EXTENDED RELEASE ORAL
Qty: 360 TABLET | Refills: 3 | Status: SHIPPED | OUTPATIENT
Start: 2022-01-17

## 2022-01-17 RX ORDER — EZETIMIBE 10 MG/1
10 TABLET ORAL DAILY
Qty: 90 TABLET | Refills: 3 | Status: SHIPPED | OUTPATIENT
Start: 2022-01-17 | End: 2022-07-31

## 2022-01-17 NOTE — TELEPHONE ENCOUNTER
Future Appointments:  No future appointments. Last Appointment With Me:  Visit date not found     Requested Prescriptions     Pending Prescriptions Disp Refills    ezetimibe (ZETIA) 10 mg tablet 90 Tablet 3     Sig: Take 1 Tablet by mouth daily.     metFORMIN ER (GLUCOPHAGE XR) 500 mg tablet 360 Tablet 3    fexofenadine (ALLEGRA) 180 mg tablet 90 Tablet 3

## 2022-01-21 ENCOUNTER — PATIENT MESSAGE (OUTPATIENT)
Dept: INTERNAL MEDICINE CLINIC | Age: 47
End: 2022-01-21

## 2022-01-21 DIAGNOSIS — Z11.1 SCREENING-PULMONARY TB: Primary | ICD-10-CM

## 2022-01-21 DIAGNOSIS — R73.03 PREDIABETES: ICD-10-CM

## 2022-01-21 NOTE — TELEPHONE ENCOUNTER
From: Leeroy Keating  To: Reza Browning MD  Sent: 1/21/2022 9:20 AM EST  Subject: Lab test    Good Morning,  Can I please get an order to get a repeat Quantiferon and A1C?     Thanks

## 2022-01-29 LAB
GAMMA INTERFERON BACKGROUND BLD IA-ACNC: 0.03 IU/ML
M TB IFN-G BLD-IMP: NEGATIVE
M TB IFN-G CD4+ BCKGRND COR BLD-ACNC: 0 IU/ML
MITOGEN IGNF BLD-ACNC: >10 IU/ML
QUANTIFERON INCUBATION, QF1T: NORMAL
QUANTIFERON TB2 AG: 0 IU/ML
SERVICE CMNT-IMP: NORMAL

## 2022-03-19 PROBLEM — R73.03 PREDIABETES: Status: ACTIVE | Noted: 2017-05-03

## 2022-06-10 ENCOUNTER — NURSE NAVIGATOR (OUTPATIENT)
Dept: CASE MANAGEMENT | Age: 47
End: 2022-06-10

## 2022-06-10 NOTE — PROGRESS NOTES
Adiel Henderson RN at [facility] because she received an order (scanned under media) from Marysol Mays NP at [facility] stating patient needed a Breast Health Navigator Referral.      I called patient. She has recently been seen by Marysol Mays NP at [facility] and says that she was told that the next time she had imaging she was supposed to get a breast MRI. This order is not for a breast MRI, and I told her that. She asked me to call [facility] to find out what she needed to get done. I reached out to Lacey at [facility]. She can see an order for a breast MRI in her chart. She will refax that order to Nav Rausch at [facility] so they can call the patient to get a MRI of the breast scheduled.

## 2022-06-13 ENCOUNTER — TRANSCRIBE ORDER (OUTPATIENT)
Dept: SCHEDULING | Age: 47
End: 2022-06-13

## 2022-06-13 DIAGNOSIS — Z91.89 OTHER SPECIFIED PERSONAL RISK FACTORS, NOT ELSEWHERE CLASSIFIED: Primary | ICD-10-CM

## 2022-07-07 ENCOUNTER — TELEPHONE (OUTPATIENT)
Dept: INTERNAL MEDICINE CLINIC | Age: 47
End: 2022-07-07

## 2022-07-07 DIAGNOSIS — Z00.00 ROUTINE GENERAL MEDICAL EXAMINATION AT A HEALTH CARE FACILITY: Primary | ICD-10-CM

## 2022-07-07 DIAGNOSIS — E55.9 VITAMIN D DEFICIENCY: ICD-10-CM

## 2022-07-07 NOTE — TELEPHONE ENCOUNTER
Patient is requesting lab orders prior to her appointment. She would like to get them done before 7/14/22. Please call patient once they are available. Thanks.

## 2022-07-08 ENCOUNTER — APPOINTMENT (OUTPATIENT)
Dept: INTERNAL MEDICINE CLINIC | Age: 47
End: 2022-07-08

## 2022-07-08 DIAGNOSIS — E55.9 VITAMIN D DEFICIENCY: ICD-10-CM

## 2022-07-08 DIAGNOSIS — Z00.00 ROUTINE GENERAL MEDICAL EXAMINATION AT A HEALTH CARE FACILITY: ICD-10-CM

## 2022-07-09 LAB
25(OH)D3 SERPL-MCNC: 35 NG/ML (ref 30–100)
ALBUMIN SERPL-MCNC: 4.1 G/DL (ref 3.5–5)
ALBUMIN/GLOB SERPL: 1.1 {RATIO} (ref 1.1–2.2)
ALP SERPL-CCNC: 44 U/L (ref 45–117)
ALT SERPL-CCNC: 15 U/L (ref 12–78)
ANION GAP SERPL CALC-SCNC: 7 MMOL/L (ref 5–15)
AST SERPL-CCNC: 9 U/L (ref 15–37)
BILIRUB SERPL-MCNC: 0.4 MG/DL (ref 0.2–1)
BUN SERPL-MCNC: 9 MG/DL (ref 6–20)
BUN/CREAT SERPL: 10 (ref 12–20)
CALCIUM SERPL-MCNC: 9.7 MG/DL (ref 8.5–10.1)
CHLORIDE SERPL-SCNC: 109 MMOL/L (ref 97–108)
CHOLEST SERPL-MCNC: 173 MG/DL
CO2 SERPL-SCNC: 25 MMOL/L (ref 21–32)
CREAT SERPL-MCNC: 0.92 MG/DL (ref 0.55–1.02)
ERYTHROCYTE [DISTWIDTH] IN BLOOD BY AUTOMATED COUNT: 13.2 % (ref 11.5–14.5)
EST. AVERAGE GLUCOSE BLD GHB EST-MCNC: 123 MG/DL
GLOBULIN SER CALC-MCNC: 3.7 G/DL (ref 2–4)
GLUCOSE SERPL-MCNC: 89 MG/DL (ref 65–100)
HBA1C MFR BLD: 5.9 % (ref 4–5.6)
HCT VFR BLD AUTO: 37.7 % (ref 35–47)
HDLC SERPL-MCNC: 63 MG/DL
HDLC SERPL: 2.7 {RATIO} (ref 0–5)
HGB BLD-MCNC: 12.1 G/DL (ref 11.5–16)
LDLC SERPL CALC-MCNC: 98.4 MG/DL (ref 0–100)
MCH RBC QN AUTO: 29.6 PG (ref 26–34)
MCHC RBC AUTO-ENTMCNC: 32.1 G/DL (ref 30–36.5)
MCV RBC AUTO: 92.2 FL (ref 80–99)
NRBC # BLD: 0 K/UL (ref 0–0.01)
NRBC BLD-RTO: 0 PER 100 WBC
PLATELET # BLD AUTO: 351 K/UL (ref 150–400)
PMV BLD AUTO: 10.2 FL (ref 8.9–12.9)
POTASSIUM SERPL-SCNC: 4.1 MMOL/L (ref 3.5–5.1)
PROT SERPL-MCNC: 7.8 G/DL (ref 6.4–8.2)
RBC # BLD AUTO: 4.09 M/UL (ref 3.8–5.2)
SODIUM SERPL-SCNC: 141 MMOL/L (ref 136–145)
TRIGL SERPL-MCNC: 58 MG/DL (ref ?–150)
TSH SERPL DL<=0.05 MIU/L-ACNC: 0.84 UIU/ML (ref 0.36–3.74)
VLDLC SERPL CALC-MCNC: 11.6 MG/DL
WBC # BLD AUTO: 5.4 K/UL (ref 3.6–11)

## 2022-07-31 RX ORDER — EZETIMIBE 10 MG/1
TABLET ORAL
Qty: 90 TABLET | Refills: 3 | Status: SHIPPED | OUTPATIENT
Start: 2022-07-31

## 2022-11-27 RX ORDER — MINERAL OIL
ENEMA (ML) RECTAL
Qty: 90 TABLET | Refills: 3 | Status: SHIPPED | OUTPATIENT
Start: 2022-11-27

## 2022-11-27 RX ORDER — METFORMIN HYDROCHLORIDE 500 MG/1
TABLET, EXTENDED RELEASE ORAL
Qty: 360 TABLET | Refills: 3 | Status: SHIPPED | OUTPATIENT
Start: 2022-11-27

## 2022-11-27 RX ORDER — EZETIMIBE 10 MG/1
TABLET ORAL
Qty: 90 TABLET | Refills: 3 | Status: SHIPPED | OUTPATIENT
Start: 2022-11-27

## 2023-01-31 ENCOUNTER — TRANSCRIBE ORDER (OUTPATIENT)
Dept: SCHEDULING | Age: 48
End: 2023-01-31

## 2023-01-31 DIAGNOSIS — Z12.31 VISIT FOR SCREENING MAMMOGRAM: Primary | ICD-10-CM

## 2023-02-01 ENCOUNTER — HOSPITAL ENCOUNTER (OUTPATIENT)
Dept: MAMMOGRAPHY | Age: 48
Discharge: HOME | End: 2023-02-01
Attending: INTERNAL MEDICINE
Payer: COMMERCIAL

## 2023-02-01 DIAGNOSIS — Z12.31 VISIT FOR SCREENING MAMMOGRAM: ICD-10-CM

## 2023-02-01 PROCEDURE — 77063 BREAST TOMOSYNTHESIS BI: CPT

## 2023-02-28 RX ORDER — EZETIMIBE 10 MG/1
TABLET ORAL
Qty: 90 TABLET | Refills: 3 | Status: SHIPPED | OUTPATIENT
Start: 2023-02-28

## 2023-02-28 RX ORDER — METFORMIN HYDROCHLORIDE 500 MG/1
TABLET, EXTENDED RELEASE ORAL
Qty: 360 TABLET | Refills: 3 | Status: SHIPPED | OUTPATIENT
Start: 2023-02-28

## 2023-02-28 RX ORDER — MINERAL OIL
ENEMA (ML) RECTAL
Qty: 90 TABLET | Refills: 3 | Status: SHIPPED | OUTPATIENT
Start: 2023-02-28

## 2023-03-28 NOTE — PROGRESS NOTES
HISTORY OF PRESENT ILLNESS  Twin Horan is a 49 y.o. female. 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  Weight up a few lbs despite good diet  On red yeast rice for mild HLD  Sees gyn MD for well woman care  Stopped vit d supplement    Patient Active Problem List    Diagnosis Date Noted    Prediabetes 05/03/2017    Vitamin D deficiency 01/05/2012    Hyperlipidemia 08/17/2011     Current Outpatient Medications   Medication Sig Dispense Refill    fexofenadine (ALLEGRA) 180 mg tablet TAKE 1 TABLET BY MOUTH ONE TIME A DAY 90 Tablet 3    ezetimibe (ZETIA) 10 mg tablet TAKE 1 TABLET BY MOUTH ONE TIME A DAY 90 Tablet 3    metFORMIN ER (GLUCOPHAGE XR) 500 mg tablet TAKE FOUR TABLETS BY MOUTH EVERY DAY WITH DINNER 360 Tablet 3    ALPRAZolam (XANAX) 0.25 mg tablet TAKE 1 TABLET BY MOUTH ONCE DAILY AS NEEDED FOR ANXIETY 20 Tablet 0    Vitamin D3 50 mcg (2,000 unit) cap capsule TAKE 1 TABLET BY MOUTH EVERY SUNDAY. 12 Cap 3    red yeast rice extract 600 mg cap Take 600 mg by mouth two (2) times a day. fluticasone (FLONASE) 50 mcg/actuation nasal spray 2 Sprays by Both Nostrils route daily. 1 Bottle 3    sodium chloride (SALINE MIST) 0.65 % nasal squeeze bottle 0.05 mL by Both Nostrils route as needed for Congestion. 30 mL 3    medroxyPROGESTERone (DEPO-PROVERA) 150 mg/mL syrg 1 PREFILLED SYRINGE, BRING UNIT TO OFFICE  4    ibuprofen (MOTRIN) 200 mg tablet Take 1 Tab by mouth every eight (8) hours as needed for Pain (fever). 30 Tab 0    smbuegnfg-im-pbgwialu-guaifen (TYLENOL COLD AND FLU SEVERE) 5--200 mg tab Take 1 Tab by mouth every six (6) hours as needed.  (Patient not taking: Reported on 7/16/2021) 24 Tab 0    omega-3 fatty acids-vitamin e (FISH OIL) 1,000 mg cap Take 1 Cap by mouth.        multivitamin (ONE A DAY) tablet Take 1 Tab by mouth daily. Allergies   Allergen Reactions    Crestor [Rosuvastatin] Myalgia    Pravastatin Myalgia      Lab Results   Component Value Date/Time    WBC 5.4 07/08/2022 12:34 PM    HGB 12.1 07/08/2022 12:34 PM    HCT 37.7 07/08/2022 12:34 PM    PLATELET 135 31/93/3628 12:34 PM    MCV 92.2 07/08/2022 12:34 PM     Lab Results   Component Value Date/Time    Hemoglobin A1c 5.9 (H) 07/08/2022 12:34 PM    Hemoglobin A1c 5.9 (H) 07/16/2021 10:41 AM    Hemoglobin A1c 6.0 (H) 02/09/2021 08:33 AM    Glucose 89 07/08/2022 12:34 PM    LDL, calculated 98.4 07/08/2022 12:34 PM    Creatinine 0.92 07/08/2022 12:34 PM      Lab Results   Component Value Date/Time    Cholesterol, total 173 07/08/2022 12:34 PM    HDL Cholesterol 63 07/08/2022 12:34 PM    LDL, calculated 98.4 07/08/2022 12:34 PM    Triglyceride 58 07/08/2022 12:34 PM    CHOL/HDL Ratio 2.7 07/08/2022 12:34 PM     Lab Results   Component Value Date/Time    GFR est non-AA >60 07/08/2022 12:34 PM    GFR est AA >60 07/08/2022 12:34 PM    Creatinine 0.92 07/08/2022 12:34 PM    BUN 9 07/08/2022 12:34 PM    Sodium 141 07/08/2022 12:34 PM    Potassium 4.1 07/08/2022 12:34 PM    Chloride 109 (H) 07/08/2022 12:34 PM    CO2 25 07/08/2022 12:34 PM     No results found for: PSA, Sumaya Annalise, [id number], [id number]  Lab Results   Component Value Date/Time    TSH 0.84 07/08/2022 12:34 PM      Lab Results   Component Value Date/Time    Glucose 89 07/08/2022 12:34 PM         Review of Systems   Constitutional:  Negative for chills, fever, malaise/fatigue and weight loss. Eyes:  Negative for blurred vision and double vision. Respiratory:  Negative for cough and shortness of breath. Cardiovascular:  Negative for chest pain and palpitations.    Gastrointestinal:  Negative for abdominal pain, blood in stool, constipation, diarrhea, melena, nausea and vomiting. Genitourinary:  Negative for dysuria, frequency, hematuria and urgency. Musculoskeletal:  Negative for back pain, falls, joint pain and myalgias. Neurological:  Negative for dizziness, tremors and headaches. Physical Exam  Vitals and nursing note reviewed. Constitutional:       Appearance: She is well-developed. HENT:      Head: Normocephalic and atraumatic. Right Ear: Tympanic membrane normal.      Left Ear: Tympanic membrane normal.   Eyes:      Pupils: Pupils are equal, round, and reactive to light. Neck:      Vascular: No carotid bruit. Cardiovascular:      Rate and Rhythm: Normal rate and regular rhythm. Pulses: Normal pulses. Heart sounds: Normal heart sounds. No murmur heard. No friction rub. No gallop. Pulmonary:      Effort: Pulmonary effort is normal. No respiratory distress. Breath sounds: Normal breath sounds. No wheezing or rales. Abdominal:      Palpations: Abdomen is soft. Musculoskeletal:      Cervical back: Normal range of motion and neck supple. Right lower leg: No edema. Left lower leg: No edema. Lymphadenopathy:      Cervical: No cervical adenopathy. Skin:     General: Skin is warm. Neurological:      General: No focal deficit present. Mental Status: She is alert. Psychiatric:         Mood and Affect: Mood normal.         Behavior: Behavior normal.         Thought Content: Thought content normal.         Judgment: Judgment normal.       ASSESSMENT and PLAN    ICD-10-CM ICD-9-CM    1. Routine general medical examination at a health care facility  Z00.00 V70.0 CBC W/O DIFF      METABOLIC PANEL, COMPREHENSIVE      LIPID PANEL      TSH 3RD GENERATION      COLOGUARD TEST (FECAL DNA COLORECTAL CANCER SCREENING)  Will see gyn MD annually      2. Prediabetes  R73.03 790.29 HEMOGLOBIN A1C WITH EAG  On metformin  Continue diet and exercise      3.  Hyperlipidemia, unspecified hyperlipidemia type  E78.5 272.4 LIPID PANEL  Continue zetia , diet and exercise   Rtc 1 year CPE\

## 2023-03-29 ENCOUNTER — OFFICE VISIT (OUTPATIENT)
Dept: INTERNAL MEDICINE CLINIC | Age: 48
End: 2023-03-29
Payer: COMMERCIAL

## 2023-03-29 VITALS
HEART RATE: 95 BPM | OXYGEN SATURATION: 99 % | WEIGHT: 159.6 LBS | HEIGHT: 65 IN | DIASTOLIC BLOOD PRESSURE: 76 MMHG | SYSTOLIC BLOOD PRESSURE: 118 MMHG | BODY MASS INDEX: 26.59 KG/M2 | RESPIRATION RATE: 16 BRPM | TEMPERATURE: 97 F

## 2023-03-29 DIAGNOSIS — Z00.00 ROUTINE GENERAL MEDICAL EXAMINATION AT A HEALTH CARE FACILITY: Primary | ICD-10-CM

## 2023-03-29 DIAGNOSIS — R73.03 PREDIABETES: ICD-10-CM

## 2023-03-29 DIAGNOSIS — E78.5 HYPERLIPIDEMIA, UNSPECIFIED HYPERLIPIDEMIA TYPE: ICD-10-CM

## 2023-03-29 PROCEDURE — 99396 PREV VISIT EST AGE 40-64: CPT | Performed by: INTERNAL MEDICINE

## 2023-03-29 NOTE — PROGRESS NOTES
1. Have you been to the ER, urgent care clinic since your last visit? Hospitalized since your last visit? No    2. Have you seen or consulted any other health care providers outside of the [address] [place] [address] since your last visit? Include any pap smears or colon screening.  No        Marifer Page//mammogram couple months ago

## 2023-05-15 RX ORDER — FEXOFENADINE HCL 180 MG/1
TABLET ORAL
Qty: 90 TABLET | Refills: 3 | Status: SHIPPED | OUTPATIENT
Start: 2023-05-15

## 2023-05-15 RX ORDER — EZETIMIBE 10 MG/1
TABLET ORAL
Qty: 90 TABLET | Refills: 3 | Status: SHIPPED | OUTPATIENT
Start: 2023-05-15

## 2023-05-15 RX ORDER — METFORMIN HYDROCHLORIDE 500 MG/1
TABLET, EXTENDED RELEASE ORAL
Qty: 360 TABLET | Refills: 3 | Status: SHIPPED | OUTPATIENT
Start: 2023-05-15

## 2023-05-15 NOTE — TELEPHONE ENCOUNTER
PCP: Shavon Wang MD    Last appt:   3/29/2023    Future Appointments   Date Time Provider Luis A Robert   4/1/2024  8:30 AM Maranda Llanos MD [facility] BS AMB       Requested Prescriptions     Pending Prescriptions Disp Refills    metFORMIN (GLUCOPHAGE-XR) 500 MG extended release tablet [Pharmacy Med Name: METFORMIN HCL ER 500MG TB24] 360 tablet 3     Sig: TAKE FOUR TABLETS BY MOUTH EVERY DAY WITH DINNER    ezetimibe (Jennifer Simper) 10 MG tablet [Pharmacy Med Name: EZETIMIBE 10MG TABS] 90 tablet 3     Sig: TAKE 1 TABLET BY MOUTH ONE TIME A DAY    fexofenadine (ALLEGRA) 180 MG tablet [Pharmacy Med Name: FEXOFENADINE HCL 180MG TABS] 90 tablet 3     Sig: TAKE 1 TABLET BY MOUTH ONE TIME A DAY

## 2023-06-20 ENCOUNTER — TELEMEDICINE (OUTPATIENT)
Age: 48
End: 2023-06-20
Payer: COMMERCIAL

## 2023-06-20 ENCOUNTER — PATIENT MESSAGE (OUTPATIENT)
Age: 48
End: 2023-06-20

## 2023-06-20 DIAGNOSIS — R10.32 LEFT GROIN PAIN: Primary | ICD-10-CM

## 2023-06-20 PROCEDURE — 99214 OFFICE O/P EST MOD 30 MIN: CPT | Performed by: INTERNAL MEDICINE

## 2023-06-20 RX ORDER — CHLORAL HYDRATE 500 MG
1 CAPSULE ORAL
COMMUNITY

## 2023-06-20 RX ORDER — POLYETHYLENE GLYCOL 3350 17 G/17G
POWDER, FOR SOLUTION ORAL
COMMUNITY
Start: 2019-06-21

## 2023-06-20 RX ORDER — CETIRIZINE HYDROCHLORIDE 10 MG/1
1 TABLET ORAL DAILY
COMMUNITY
Start: 2018-04-27

## 2023-06-20 SDOH — ECONOMIC STABILITY: FOOD INSECURITY: WITHIN THE PAST 12 MONTHS, YOU WORRIED THAT YOUR FOOD WOULD RUN OUT BEFORE YOU GOT MONEY TO BUY MORE.: NEVER TRUE

## 2023-06-20 SDOH — ECONOMIC STABILITY: FOOD INSECURITY: WITHIN THE PAST 12 MONTHS, THE FOOD YOU BOUGHT JUST DIDN'T LAST AND YOU DIDN'T HAVE MONEY TO GET MORE.: NEVER TRUE

## 2023-06-20 SDOH — ECONOMIC STABILITY: HOUSING INSECURITY
IN THE LAST 12 MONTHS, WAS THERE A TIME WHEN YOU DID NOT HAVE A STEADY PLACE TO SLEEP OR SLEPT IN A SHELTER (INCLUDING NOW)?: NO

## 2023-06-20 SDOH — ECONOMIC STABILITY: INCOME INSECURITY: HOW HARD IS IT FOR YOU TO PAY FOR THE VERY BASICS LIKE FOOD, HOUSING, MEDICAL CARE, AND HEATING?: NOT HARD AT ALL

## 2023-06-20 NOTE — PROGRESS NOTES
1. \"Have you been to the ER, urgent care clinic since your last visit? Hospitalized since your last visit? \" No    2. \"Have you seen or consulted any other health care providers outside of the [address] [place] [address] since your last visit? \" No     3. For patients aged 39-70: Has the patient had a colonoscopy / FIT/ Cologuard? COLOGUARD 2023      If the patient is female:    4. For patients aged 41-77: Has the patient had a mammogram within the past 2 years? 2022 [id number]      5. For patients aged 21-65: Has the patient had a pap smear?   Hilda Keller

## 2023-06-20 NOTE — PROGRESS NOTES
HISTORY OF PRESENT ILLNESS   Brea Cooper   is a 49 y.o.  female. Brea Cooper, was evaluated through a synchronous (real-time) audio-video encounter. The patient (or guardian if applicable) is aware that this is a billable service, which includes applicable co-pays. This Virtual Visit was conducted with patient's (and/or legal guardian's) consent. Patient identification was verified, and a caregiver was present when appropriate. The patient was located at Home: [address] [place] [phone]  Provider was located at Home ([place] [address]): [facility]         Total time spent for this encounter: 15 min    --Ana Ely MD on 6/20/2023 at 3:56 PM    An electronic signature was used to authenticate this note. Hx HLD prediabetes vit d defic    C/o LLQ /left groin pain x 1 week  Pain is usually sharp intermittent lasting a few minutes several timnes per day but not getting worse  No f/c n/v  She thought this may be related to menstrual pain on depoprovera last injection was 2 months ago.   She believes the pain is more in the groin than than LLQ of abdomen  Had negative cologuard this year  Daily normal BM's per pt    Patient Active Problem List    Diagnosis Date Noted    Prediabetes 05/03/2017    Vitamin D deficiency 01/05/2012    Hyperlipidemia 08/17/2011     Current Outpatient Medications   Medication Sig Dispense Refill    Omega-3 1000 MG CAPS Take 1 capsule by mouth      cetirizine (ZYRTEC) 10 MG tablet Take 1 tablet by mouth daily      polyethylene glycol (GLYCOLAX) 17 GM/SCOOP powder       metFORMIN (GLUCOPHAGE-XR) 500 MG extended release tablet TAKE FOUR TABLETS BY MOUTH EVERY DAY WITH DINNER 360 tablet 3    ezetimibe (ZETIA) 10 MG tablet TAKE 1 TABLET BY MOUTH ONE TIME A DAY 90 tablet 3    fexofenadine (ALLEGRA) 180 MG tablet TAKE 1 TABLET BY MOUTH ONE TIME A DAY 90 tablet 3    ALPRAZolam (XANAX) 0.25 MG tablet TAKE 1 TABLET BY MOUTH ONCE DAILY AS NEEDED FOR ANXIETY      Cholecalciferol 50

## 2023-06-20 NOTE — TELEPHONE ENCOUNTER
From: Farhana Alvarez  To: Dr. Mulu Pruetter: 6/19/2023 12:22 PM EDT  Subject: Appointment Request    Appointment Request From: Farhana Alvarez    With Provider: Madina Dyer MD [facility]]    Preferred Date Range: 6/19/2023  6/23/2023    Preferred Times: Any Time    Reason for visit: Request an Appointment    Comments:  Intermittent pain LLQ.   MD said I can request a virtual visit as needed

## 2023-06-28 ENCOUNTER — HOSPITAL ENCOUNTER (OUTPATIENT)
Facility: HOSPITAL | Age: 48
Discharge: HOME | End: 2023-07-01
Attending: INTERNAL MEDICINE
Payer: COMMERCIAL

## 2023-06-28 DIAGNOSIS — R10.32 LEFT GROIN PAIN: ICD-10-CM

## 2023-06-28 PROCEDURE — 76830 TRANSVAGINAL US NON-OB: CPT

## 2023-06-28 PROCEDURE — 76856 US EXAM PELVIC COMPLETE: CPT

## 2023-06-29 DIAGNOSIS — R10.32 LLQ PAIN: Primary | ICD-10-CM

## 2023-09-01 RX ORDER — FEXOFENADINE HCL 180 MG/1
TABLET ORAL
Qty: 90 TABLET | Refills: 3 | Status: SHIPPED | OUTPATIENT
Start: 2023-09-01

## 2023-09-18 RX ORDER — METFORMIN HYDROCHLORIDE 500 MG/1
2000 TABLET, EXTENDED RELEASE ORAL
Qty: 360 TABLET | Refills: 3 | Status: SHIPPED | OUTPATIENT
Start: 2023-09-18

## 2023-09-18 RX ORDER — EZETIMIBE 10 MG/1
10 TABLET ORAL DAILY
Qty: 90 TABLET | Refills: 3 | Status: SHIPPED | OUTPATIENT
Start: 2023-09-18

## 2023-12-06 RX ORDER — EZETIMIBE 10 MG/1
10 TABLET ORAL DAILY
Qty: 90 TABLET | Refills: 3 | Status: SHIPPED | OUTPATIENT
Start: 2023-12-06

## 2024-02-06 ENCOUNTER — TRANSCRIBE ORDERS (OUTPATIENT)
Facility: HOSPITAL | Age: 49
End: 2024-02-06

## 2024-02-06 DIAGNOSIS — Z12.31 VISIT FOR SCREENING MAMMOGRAM: Primary | ICD-10-CM

## 2024-02-07 ENCOUNTER — TELEPHONE (OUTPATIENT)
Age: 49
End: 2024-02-07

## 2024-02-07 NOTE — TELEPHONE ENCOUNTER
Left voicemail asking patient to contact office regarding rescheduling upcoming appointment on 4/1/2024 due to provider being unavailable.

## 2024-02-07 NOTE — TELEPHONE ENCOUNTER
----- Message from Simi Mulligan sent at 2/7/2024  3:25 PM EST -----  Subject: Appointment Request    Reason for Call: Established Patient Appointment needed: Routine Physical   Exam    QUESTIONS    Reason for appointment request? No appointments available during search     Additional Information for Provider? Pt called to rescheduled her   appointment like she was ask but no appointment was available .Please   reach out to pt to get her scheduled   ---------------------------------------------------------------------------  --------------  CALL BACK INFO  [phone]; OK to leave message on voicemail  ---------------------------------------------------------------------------  --------------  SCRIPT ANSWERS

## 2024-02-12 ENCOUNTER — TELEPHONE (OUTPATIENT)
Age: 49
End: 2024-02-12

## 2024-02-12 DIAGNOSIS — Z12.39 BREAST SCREENING: Primary | ICD-10-CM

## 2024-02-12 NOTE — TELEPHONE ENCOUNTER
Regardng Mammogram order    Problem:  Date: 2/6/2024   Department: [facility] SCHEDULING/[facility]   Ordering: Jordyn Rueda   Authorizing: Rosalie Henley, APRN - NP        It needs to be in dr medina's name per patient.  Can dr medina re-put in the order.    TIME SENSITIVE--APPT IS WED      IF ANY TROUBLE CALL PT

## 2024-02-14 ENCOUNTER — HOSPITAL ENCOUNTER (OUTPATIENT)
Facility: HOSPITAL | Age: 49
Discharge: HOME | End: 2024-02-17
Payer: COMMERCIAL

## 2024-02-14 VITALS — HEIGHT: 65 IN | BODY MASS INDEX: 26.49 KG/M2 | WEIGHT: 159 LBS

## 2024-02-14 DIAGNOSIS — Z12.31 VISIT FOR SCREENING MAMMOGRAM: ICD-10-CM

## 2024-02-14 PROCEDURE — 77063 BREAST TOMOSYNTHESIS BI: CPT

## 2024-04-02 DIAGNOSIS — E78.5 HYPERLIPIDEMIA, UNSPECIFIED HYPERLIPIDEMIA TYPE: ICD-10-CM

## 2024-04-02 DIAGNOSIS — R73.03 PREDIABETES: ICD-10-CM

## 2024-04-02 DIAGNOSIS — E55.9 VITAMIN D DEFICIENCY: ICD-10-CM

## 2024-04-02 DIAGNOSIS — Z00.00 ROUTINE PHYSICAL EXAMINATION: Primary | ICD-10-CM

## 2024-04-16 ENCOUNTER — NURSE ONLY (OUTPATIENT)
Age: 49
End: 2024-04-16

## 2024-04-16 DIAGNOSIS — R73.03 PREDIABETES: ICD-10-CM

## 2024-04-16 DIAGNOSIS — E55.9 VITAMIN D DEFICIENCY: ICD-10-CM

## 2024-04-16 DIAGNOSIS — E78.5 HYPERLIPIDEMIA, UNSPECIFIED HYPERLIPIDEMIA TYPE: ICD-10-CM

## 2024-04-16 DIAGNOSIS — Z00.00 ROUTINE PHYSICAL EXAMINATION: ICD-10-CM

## 2024-04-17 LAB
25(OH)D3 SERPL-MCNC: 30.7 NG/ML (ref 30–100)
ALBUMIN SERPL-MCNC: 4 G/DL (ref 3.5–5)
ALBUMIN/GLOB SERPL: 1.1 (ref 1.1–2.2)
ALP SERPL-CCNC: 45 U/L (ref 45–117)
ALT SERPL-CCNC: 15 U/L (ref 12–78)
ANION GAP SERPL CALC-SCNC: 7 MMOL/L (ref 5–15)
AST SERPL-CCNC: 11 U/L (ref 15–37)
BILIRUB SERPL-MCNC: 0.5 MG/DL (ref 0.2–1)
BUN SERPL-MCNC: 9 MG/DL (ref 6–20)
BUN/CREAT SERPL: 10 (ref 12–20)
CALCIUM SERPL-MCNC: 10.1 MG/DL (ref 8.5–10.1)
CHLORIDE SERPL-SCNC: 110 MMOL/L (ref 97–108)
CHOLEST SERPL-MCNC: 180 MG/DL
CO2 SERPL-SCNC: 24 MMOL/L (ref 21–32)
CREAT SERPL-MCNC: 0.9 MG/DL (ref 0.55–1.02)
ERYTHROCYTE [DISTWIDTH] IN BLOOD BY AUTOMATED COUNT: 13.1 % (ref 11.5–14.5)
EST. AVERAGE GLUCOSE BLD GHB EST-MCNC: 120 MG/DL
GLOBULIN SER CALC-MCNC: 3.7 G/DL (ref 2–4)
GLUCOSE SERPL-MCNC: 100 MG/DL (ref 65–100)
HBA1C MFR BLD: 5.8 % (ref 4–5.6)
HCT VFR BLD AUTO: 36 % (ref 35–47)
HDLC SERPL-MCNC: 57 MG/DL
HDLC SERPL: 3.2 (ref 0–5)
HGB BLD-MCNC: 11.6 G/DL (ref 11.5–16)
LDLC SERPL CALC-MCNC: 109.2 MG/DL (ref 0–100)
MCH RBC QN AUTO: 29 PG (ref 26–34)
MCHC RBC AUTO-ENTMCNC: 32.2 G/DL (ref 30–36.5)
MCV RBC AUTO: 90 FL (ref 80–99)
NRBC # BLD: 0 K/UL (ref 0–0.01)
NRBC BLD-RTO: 0 PER 100 WBC
PLATELET # BLD AUTO: 487 K/UL (ref 150–400)
PMV BLD AUTO: 9.7 FL (ref 8.9–12.9)
POTASSIUM SERPL-SCNC: 4.3 MMOL/L (ref 3.5–5.1)
PROT SERPL-MCNC: 7.7 G/DL (ref 6.4–8.2)
RBC # BLD AUTO: 4 M/UL (ref 3.8–5.2)
SODIUM SERPL-SCNC: 141 MMOL/L (ref 136–145)
TRIGL SERPL-MCNC: 69 MG/DL
TSH SERPL DL<=0.05 MIU/L-ACNC: 0.56 UIU/ML (ref 0.36–3.74)
VLDLC SERPL CALC-MCNC: 13.8 MG/DL
WBC # BLD AUTO: 4.3 K/UL (ref 3.6–11)

## 2024-04-20 NOTE — PATIENT INSTRUCTIONS
Office Policies    Phone calls/patient messages:            Please allow up to 24 hours for someone in the office to contact you about your call or message. Be mindful your provider may be out of the office or your message may require further review. We encourage you to use [employer] for your messages as this is a faster, more efficient way to communicate with our office                         Medication Refills:            Prescription medications require 48-72 business hours to process. We encourage you to use [employer] for your refills. For controlled medications: Please allow 72 business hours to process. Certain medications may require you to  a written prescription at our office. NO narcotic/controlled medications will be prescribed after 4pm Monday through Friday or on weekends              Form/Paperwork Completion:            Please note a $25 fee may incur for all paperwork for completed by our providers. We ask that you allow 7-10 business days. Pre-payment is due prior to picking up/faxing the completed form. You may also download your forms to [employer] to have your doctor print off. no body aches

## 2024-04-30 ENCOUNTER — OFFICE VISIT (OUTPATIENT)
Age: 49
End: 2024-04-30
Payer: COMMERCIAL

## 2024-04-30 VITALS
WEIGHT: 152.2 LBS | SYSTOLIC BLOOD PRESSURE: 110 MMHG | HEIGHT: 65 IN | BODY MASS INDEX: 25.36 KG/M2 | TEMPERATURE: 97.1 F | RESPIRATION RATE: 14 BRPM | HEART RATE: 88 BPM | DIASTOLIC BLOOD PRESSURE: 70 MMHG | OXYGEN SATURATION: 100 %

## 2024-04-30 DIAGNOSIS — E78.5 HYPERLIPIDEMIA, UNSPECIFIED HYPERLIPIDEMIA TYPE: ICD-10-CM

## 2024-04-30 DIAGNOSIS — Z00.00 ROUTINE PHYSICAL EXAMINATION: Primary | ICD-10-CM

## 2024-04-30 DIAGNOSIS — R73.03 PREDIABETES: ICD-10-CM

## 2024-04-30 DIAGNOSIS — J01.20 ACUTE ETHMOIDAL SINUSITIS, RECURRENCE NOT SPECIFIED: ICD-10-CM

## 2024-04-30 PROCEDURE — 99396 PREV VISIT EST AGE 40-64: CPT | Performed by: INTERNAL MEDICINE

## 2024-04-30 RX ORDER — AMOXICILLIN 500 MG/1
500 CAPSULE ORAL 3 TIMES DAILY
Qty: 21 CAPSULE | Refills: 0 | Status: SHIPPED | OUTPATIENT
Start: 2024-04-30 | End: 2024-05-07

## 2024-04-30 ASSESSMENT — PATIENT HEALTH QUESTIONNAIRE - PHQ9
SUM OF ALL RESPONSES TO PHQ9 QUESTIONS 1 & 2: 0
SUM OF ALL RESPONSES TO PHQ QUESTIONS 1-9: 0
1. LITTLE INTEREST OR PLEASURE IN DOING THINGS: NOT AT ALL
SUM OF ALL RESPONSES TO PHQ QUESTIONS 1-9: 0
2. FEELING DOWN, DEPRESSED OR HOPELESS: NOT AT ALL

## 2024-04-30 ASSESSMENT — ENCOUNTER SYMPTOMS
GASTROINTESTINAL NEGATIVE: 1
ALLERGIC/IMMUNOLOGIC NEGATIVE: 1
RESPIRATORY NEGATIVE: 1
EYES NEGATIVE: 1

## 2024-04-30 NOTE — PROGRESS NOTES
Chief Complaint   Patient presents with    Annual Exam       \"Have you been to the ER, urgent care clinic since your last visit?  Hospitalized since your last visit?\"    NO    “Have you seen or consulted any other health care providers outside of [facility] since your last visit?”    NO            Click Here for Release of Records Request   
MG/ML injection 1 PREFILLED SYRINGE, BRING UNIT TO OFFICE       No current facility-administered medications for this visit.     Allergies   Allergen Reactions    Pravastatin Myalgia    Rosuvastatin Myalgia     Social History     Tobacco Use    Smoking status: Never    Smokeless tobacco: Never   Substance Use Topics    Alcohol use: No        BMP:   Lab Results   Component Value Date/Time     04/16/2024 08:45 AM    K 4.3 04/16/2024 08:45 AM     04/16/2024 08:45 AM    CO2 24 04/16/2024 08:45 AM    BUN 9 04/16/2024 08:45 AM    CREATININE 0.90 04/16/2024 08:45 AM    GLUCOSE 100 04/16/2024 08:45 AM    CALCIUM 10.1 04/16/2024 08:45 AM      CBC:   Lab Results   Component Value Date/Time    WBC 4.3 04/16/2024 08:45 AM    RBC 4.00 04/16/2024 08:45 AM    HGB 11.6 04/16/2024 08:45 AM    HCT 36.0 04/16/2024 08:45 AM    MCV 90.0 04/16/2024 08:45 AM    MCH 29.0 04/16/2024 08:45 AM    MCHC 32.2 04/16/2024 08:45 AM    RDW 13.1 04/16/2024 08:45 AM     04/16/2024 08:45 AM    MPV 9.7 04/16/2024 08:45 AM      Lipids   Lab Results   Component Value Date/Time    CHOL 180 04/16/2024 08:45 AM    TRIG 69 04/16/2024 08:45 AM    HDL 57 04/16/2024 08:45 AM    LDLCALC 109.2 04/16/2024 08:45 AM    CHOLHDLRATIO 3.2 04/16/2024 08:45 AM     Hemoglobin A1C:   Hemoglobin A1C   Date Value Ref Range Status   04/16/2024 5.8 (H) 4.0 - 5.6 % Final     Comment:     (NOTE)  HbA1C Interpretive Ranges  <5.7              Normal  5.7 - 6.4         Consider Prediabetes  >6.5              Consider Diabetes       Hemoglobin A1C, POC   Date Value Ref Range Status   07/26/2019 5.5 4.8 - 5.6 % Final        Review of Systems   Constitutional: Negative.    HENT: Negative.     Eyes: Negative.    Respiratory: Negative.     Cardiovascular: Negative.    Gastrointestinal: Negative.    Endocrine: Negative.    Genitourinary: Negative.    Musculoskeletal: Negative.    Skin: Negative.    Allergic/Immunologic: Negative.    Neurological: Negative.

## 2024-05-01 ENCOUNTER — CLINICAL DOCUMENTATION (OUTPATIENT)
Age: 49
End: 2024-05-01

## 2024-12-09 RX ORDER — EZETIMIBE 10 MG/1
10 TABLET ORAL DAILY
Qty: 90 TABLET | Refills: 3 | Status: SHIPPED | OUTPATIENT
Start: 2024-12-09

## 2024-12-09 RX ORDER — METFORMIN HYDROCHLORIDE 500 MG/1
TABLET, EXTENDED RELEASE ORAL
Qty: 360 TABLET | Refills: 3 | Status: SHIPPED | OUTPATIENT
Start: 2024-12-09

## 2024-12-09 RX ORDER — FEXOFENADINE HCL 180 MG/1
180 TABLET ORAL DAILY
Qty: 90 TABLET | Refills: 3 | Status: SHIPPED | OUTPATIENT
Start: 2024-12-09

## 2025-02-20 ENCOUNTER — HOSPITAL ENCOUNTER (OUTPATIENT)
Facility: HOSPITAL | Age: 50
Discharge: HOME | End: 2025-02-23
Payer: COMMERCIAL

## 2025-02-20 DIAGNOSIS — Z12.39 BREAST SCREENING: ICD-10-CM

## 2025-02-20 PROCEDURE — 77067 SCR MAMMO BI INCL CAD: CPT

## 2025-03-05 DIAGNOSIS — E55.9 VITAMIN D DEFICIENCY: ICD-10-CM

## 2025-03-05 DIAGNOSIS — Z00.00 ROUTINE PHYSICAL EXAMINATION: Primary | ICD-10-CM

## 2025-03-28 DIAGNOSIS — E55.9 VITAMIN D DEFICIENCY: ICD-10-CM

## 2025-03-28 DIAGNOSIS — Z00.00 ROUTINE PHYSICAL EXAMINATION: ICD-10-CM

## 2025-03-28 LAB
25(OH)D3 SERPL-MCNC: 26.1 NG/ML (ref 30–100)
ALBUMIN SERPL-MCNC: 4.3 G/DL (ref 3.5–5)
ALBUMIN/GLOB SERPL: 1.2 (ref 1.1–2.2)
ALP SERPL-CCNC: 50 U/L (ref 45–117)
ALT SERPL-CCNC: 18 U/L (ref 12–78)
ANION GAP SERPL CALC-SCNC: 2 MMOL/L (ref 2–12)
AST SERPL-CCNC: 17 U/L (ref 15–37)
BILIRUB SERPL-MCNC: 0.6 MG/DL (ref 0.2–1)
BUN SERPL-MCNC: 11 MG/DL (ref 6–20)
BUN/CREAT SERPL: 12 (ref 12–20)
CALCIUM SERPL-MCNC: 10.2 MG/DL (ref 8.5–10.1)
CHLORIDE SERPL-SCNC: 108 MMOL/L (ref 97–108)
CHOLEST SERPL-MCNC: 230 MG/DL
CO2 SERPL-SCNC: 29 MMOL/L (ref 21–32)
CREAT SERPL-MCNC: 0.9 MG/DL (ref 0.55–1.02)
ERYTHROCYTE [DISTWIDTH] IN BLOOD BY AUTOMATED COUNT: 12.8 % (ref 11.5–14.5)
EST. AVERAGE GLUCOSE BLD GHB EST-MCNC: 120 MG/DL
GLOBULIN SER CALC-MCNC: 3.6 G/DL (ref 2–4)
GLUCOSE SERPL-MCNC: 94 MG/DL (ref 65–100)
HBA1C MFR BLD: 5.8 % (ref 4–5.6)
HCT VFR BLD AUTO: 35.3 % (ref 35–47)
HDLC SERPL-MCNC: 72 MG/DL
HDLC SERPL: 3.2 (ref 0–5)
HGB BLD-MCNC: 11.3 G/DL (ref 11.5–16)
LDLC SERPL CALC-MCNC: 143.2 MG/DL (ref 0–100)
MCH RBC QN AUTO: 28 PG (ref 26–34)
MCHC RBC AUTO-ENTMCNC: 32 G/DL (ref 30–36.5)
MCV RBC AUTO: 87.6 FL (ref 80–99)
NRBC # BLD: 0 K/UL (ref 0–0.01)
NRBC BLD-RTO: 0 PER 100 WBC
PLATELET # BLD AUTO: 337 K/UL (ref 150–400)
PMV BLD AUTO: 10.5 FL (ref 8.9–12.9)
POTASSIUM SERPL-SCNC: 4.9 MMOL/L (ref 3.5–5.1)
PROT SERPL-MCNC: 7.9 G/DL (ref 6.4–8.2)
RBC # BLD AUTO: 4.03 M/UL (ref 3.8–5.2)
SODIUM SERPL-SCNC: 139 MMOL/L (ref 136–145)
TRIGL SERPL-MCNC: 74 MG/DL
TSH SERPL DL<=0.05 MIU/L-ACNC: 0.6 UIU/ML (ref 0.36–3.74)
VLDLC SERPL CALC-MCNC: 14.8 MG/DL
WBC # BLD AUTO: 3.6 K/UL (ref 3.6–11)

## 2025-03-29 ENCOUNTER — RESULTS FOLLOW-UP (OUTPATIENT)
Age: 50
End: 2025-03-29

## 2025-03-31 LAB
FERRITIN SERPL-MCNC: 36 NG/ML (ref 8–252)
IRON SERPL-MCNC: 100 UG/DL (ref 35–150)
TIBC SERPL-MCNC: 348 UG/DL (ref 250–450)

## 2025-04-28 ASSESSMENT — ENCOUNTER SYMPTOMS
ALLERGIC/IMMUNOLOGIC NEGATIVE: 1
RESPIRATORY NEGATIVE: 1
EYES NEGATIVE: 1
GASTROINTESTINAL NEGATIVE: 1

## 2025-04-29 NOTE — PROGRESS NOTES
HISTORY OF PRESENT ILLNESS   Peter Garsia   is a 49 y.o.  female.    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    Last OV  Had colonoscopy last year for lower abd pain at [facility]-negative per pt     Lost a few lbs with diet and exercise  Feels well overall  She is asking if the metformin can be reduced currently on 2000mg qd     C/o increased sinus congestion and thick nasal discharge x 3days despite flonase and zyrtec     Nonsmoker  No etoh  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      fluticasone (FLONASE) 50 MCG/ACT nasal spray 2 sprays by Nasal route daily       No current facility-administered medications for this visit.     Allergies   Allergen Reactions    Pravastatin Myalgia    Rosuvastatin Myalgia     Social History     Tobacco Use    Smoking status: Never    Smokeless tobacco: Never   Substance Use Topics    Alcohol use: No        BMP:   Lab Results   Component Value Date/Time     03/28/2025 09:03 AM    K 4.9 03/28/2025 09:03 AM     03/28/2025 09:03 AM    CO2 29 03/28/2025 09:03 AM    BUN 11 03/28/2025 09:03 AM    CREATININE 0.90 03/28/2025

## 2025-05-01 ENCOUNTER — OFFICE VISIT (OUTPATIENT)
Age: 50
End: 2025-05-01
Payer: COMMERCIAL

## 2025-05-01 VITALS
RESPIRATION RATE: 16 BRPM | HEIGHT: 65 IN | SYSTOLIC BLOOD PRESSURE: 100 MMHG | DIASTOLIC BLOOD PRESSURE: 70 MMHG | TEMPERATURE: 97.2 F | WEIGHT: 149 LBS | BODY MASS INDEX: 24.83 KG/M2

## 2025-05-01 DIAGNOSIS — D64.9 ANEMIA, UNSPECIFIED TYPE: ICD-10-CM

## 2025-05-01 DIAGNOSIS — Z00.00 ROUTINE PHYSICAL EXAMINATION: Primary | ICD-10-CM

## 2025-05-01 DIAGNOSIS — E78.5 HYPERLIPIDEMIA, UNSPECIFIED HYPERLIPIDEMIA TYPE: ICD-10-CM

## 2025-05-01 DIAGNOSIS — R73.03 PREDIABETES: ICD-10-CM

## 2025-05-01 DIAGNOSIS — E55.9 VITAMIN D DEFICIENCY: ICD-10-CM

## 2025-05-01 PROCEDURE — 99396 PREV VISIT EST AGE 40-64: CPT | Performed by: INTERNAL MEDICINE

## 2025-05-01 SDOH — ECONOMIC STABILITY: FOOD INSECURITY: WITHIN THE PAST 12 MONTHS, YOU WORRIED THAT YOUR FOOD WOULD RUN OUT BEFORE YOU GOT MONEY TO BUY MORE.: NEVER TRUE

## 2025-05-01 SDOH — ECONOMIC STABILITY: FOOD INSECURITY: WITHIN THE PAST 12 MONTHS, THE FOOD YOU BOUGHT JUST DIDN'T LAST AND YOU DIDN'T HAVE MONEY TO GET MORE.: NEVER TRUE

## 2025-05-01 ASSESSMENT — PATIENT HEALTH QUESTIONNAIRE - PHQ9
SUM OF ALL RESPONSES TO PHQ QUESTIONS 1-9: 0
2. FEELING DOWN, DEPRESSED OR HOPELESS: NOT AT ALL
SUM OF ALL RESPONSES TO PHQ QUESTIONS 1-9: 0
SUM OF ALL RESPONSES TO PHQ QUESTIONS 1-9: 0
1. LITTLE INTEREST OR PLEASURE IN DOING THINGS: NOT AT ALL
SUM OF ALL RESPONSES TO PHQ QUESTIONS 1-9: 0

## 2025-05-01 NOTE — PROGRESS NOTES
Have you been to the ER, urgent care clinic since your last visit?  Hospitalized since your last visit?   no    Have you seen or consulted any other health care providers outside our system since your last visit?   no     “Have you had a pap smear?”    yes    Date of last Cervical Cancer screen (HPV or PAP): 11/19/2019

## 2025-05-07 LAB
CHOLEST SERPL-MCNC: 223 MG/DL
GLUCOSE SERPL-MCNC: 90 MG/DL (ref 65–100)
HDLC SERPL-MCNC: 74 MG/DL
LDLC SERPL CALC-MCNC: 131.2 MG/DL (ref 0–100)
TRIGL SERPL-MCNC: 89 MG/DL